# Patient Record
Sex: FEMALE | Race: BLACK OR AFRICAN AMERICAN | NOT HISPANIC OR LATINO | Employment: OTHER | URBAN - NONMETROPOLITAN AREA
[De-identification: names, ages, dates, MRNs, and addresses within clinical notes are randomized per-mention and may not be internally consistent; named-entity substitution may affect disease eponyms.]

---

## 2021-04-21 ENCOUNTER — IMMUNIZATION (OUTPATIENT)
Dept: VACCINE CLINIC | Facility: HOSPITAL | Age: 71
End: 2021-04-21

## 2021-04-21 PROCEDURE — 91300 HC SARSCOV02 VAC 30MCG/0.3ML IM: CPT | Performed by: INTERNAL MEDICINE

## 2021-04-21 PROCEDURE — 0001A: CPT | Performed by: INTERNAL MEDICINE

## 2021-06-17 ENCOUNTER — OFFICE VISIT (OUTPATIENT)
Dept: OBSTETRICS AND GYNECOLOGY | Facility: CLINIC | Age: 71
End: 2021-06-17

## 2021-06-17 VITALS
HEIGHT: 69 IN | SYSTOLIC BLOOD PRESSURE: 130 MMHG | BODY MASS INDEX: 34.72 KG/M2 | DIASTOLIC BLOOD PRESSURE: 80 MMHG | WEIGHT: 234.4 LBS

## 2021-06-17 DIAGNOSIS — R14.0 ABDOMINAL BLOATING: Primary | ICD-10-CM

## 2021-06-17 DIAGNOSIS — R93.89 THICKENED ENDOMETRIUM: ICD-10-CM

## 2021-06-17 DIAGNOSIS — R10.2 PELVIC PAIN: ICD-10-CM

## 2021-06-17 DIAGNOSIS — Z12.31 ENCOUNTER FOR SCREENING MAMMOGRAM FOR MALIGNANT NEOPLASM OF BREAST: ICD-10-CM

## 2021-06-17 DIAGNOSIS — N39.3 STRESS INCONTINENCE: ICD-10-CM

## 2021-06-17 PROCEDURE — 99204 OFFICE O/P NEW MOD 45 MIN: CPT | Performed by: PHYSICIAN ASSISTANT

## 2021-06-17 RX ORDER — LEVOTHYROXINE SODIUM 88 UG/1
88 TABLET ORAL DAILY
COMMUNITY
Start: 2021-03-15 | End: 2022-05-02 | Stop reason: SDUPTHER

## 2021-06-17 RX ORDER — ROSUVASTATIN CALCIUM 20 MG/1
20 TABLET, COATED ORAL DAILY
COMMUNITY
Start: 2021-04-21 | End: 2022-05-02

## 2021-06-17 RX ORDER — LEVETIRACETAM 500 MG/1
500 TABLET ORAL
COMMUNITY
End: 2022-05-02 | Stop reason: SDUPTHER

## 2021-06-17 RX ORDER — HYDROCHLOROTHIAZIDE 12.5 MG/1
TABLET ORAL
COMMUNITY
End: 2022-05-02 | Stop reason: SDUPTHER

## 2021-06-17 NOTE — PROGRESS NOTES
Subjective   Chief Complaint   Patient presents with   • Pelvic Pain     Patient states that she is having severe swelling in uterus x 5 months, nausea and some pain.  History of ovarian cyst.  Patient is concerned that she is pregnant because she is feeling movement in uterus       Nicci Mojica is a 70 y.o. year old  presenting to be seen for complaints of lower abdominal pain, swelling in the uterus area, nausea.  Patient is concerned that she may be pregnant.  She feels that the symptoms she is having is consistent with pregnancy.  Reports her symptoms started about 5 months ago.  She feels some lower pelvic pain and pressure in the left lower quadrant where she thinks her left ovary is.  She had her right ovary removed in 2018 due to an ovarian cyst.   She reports having a colonoscopy that was normal in 2018 at [facility].  She has not had any vaginal bleeding.  Has not had any bowel changes.  She has not had any changes with bladder or difficulty with voiding however she has experienced stress urinary incontinence for a few years.  Her last mammogram was in 2019 and she would like to set up a screening mammogram.  Transvaginal ultrasound is done in office and reviewed with patient.  It notes anteverted normal-appearing uterus, her endometrium is 9.5 mm and heterogeneous with tiny cystic areas.  Her left ovary is not visualized.  There is no free fluid or adnexal masses.      Past Medical History:   Diagnosis Date   • Anemia    • Anxiety    • Asthma    • Bleeding disorder (CMS/HCC)    • Depression    • Disease of thyroid gland    • Fibroid    • Hyperlipidemia    • Migraine    • Multiple gestation    • Osteoarthritis    • Ovarian cyst    • Polycystic ovary syndrome    • Seizures (CMS/HCC)         Current Outpatient Medications:   •  Euthyrox 88 MCG tablet, Take 88 mcg by mouth Daily., Disp: , Rfl:   •  hydroCHLOROthiazide (HYDRODIURIL) 12.5 MG tablet, hydrochlorothiazide 12.5 mg tablet  TAKE 1  TABLET EVERY DAY, Disp: , Rfl:   •  levETIRAcetam (KEPPRA) 500 MG tablet, levetiracetam 500 mg tablet  TAKE 1 TABLET TWICE DAILY, Disp: , Rfl:   •  Prenatal Multivit-Min-Fe-FA (PRENATAL 1 + IRON PO), Prenatal, Disp: , Rfl:   •  rosuvastatin (CRESTOR) 20 MG tablet, Take 20 mg by mouth Daily., Disp: , Rfl:    Allergies   Allergen Reactions   • Sulfa Antibiotics Hives and Swelling   • Contrast Dye Hives   • Penicillins Hives and Rash      Past Surgical History:   Procedure Laterality Date   •  SECTION     • KNEE SURGERY     • OOPHORECTOMY Right       Social History     Socioeconomic History   • Marital status:      Spouse name: Not on file   • Number of children: Not on file   • Years of education: Not on file   • Highest education level: Not on file   Tobacco Use   • Smoking status: Never Smoker   • Smokeless tobacco: Never Used   Vaping Use   • Vaping Use: Never used   Substance and Sexual Activity   • Alcohol use: Defer   • Drug use: Never   • Sexual activity: Yes     Partners: Male     Birth control/protection: Post-menopausal      Family History   Problem Relation Age of Onset   • Colon cancer Father    • Cervical cancer Mother    • Ovarian cancer Mother    • Osteoporosis Mother    • Stroke Mother    • Diabetes Paternal Grandfather    • Coronary artery disease Paternal Grandfather    • Hyperlipidemia Paternal Grandfather    • Diabetes Paternal Grandmother    • Coronary artery disease Paternal Grandmother    • Hyperlipidemia Paternal Grandmother    • Diabetes Maternal Grandmother    • Coronary artery disease Maternal Grandmother    • Hyperlipidemia Maternal Grandmother    • Pulmonary embolism Maternal Grandmother    • Diabetes Maternal Grandfather    • Coronary artery disease Maternal Grandfather    • Hyperlipidemia Maternal Grandfather        Review of Systems   Constitutional: Negative for chills, diaphoresis and fever.   Gastrointestinal: Positive for nausea.   Endocrine: Positive for polydipsia.  "  Genitourinary: Positive for enuresis, pelvic pain and vaginal discharge. Negative for difficulty urinating, dysuria, menstrual problem and vaginal bleeding.   Musculoskeletal: Positive for arthralgias.   Psychiatric/Behavioral: Positive for sleep disturbance.   All other systems reviewed and are negative.          Objective   /80   Ht 175.3 cm (69\")   Wt 106 kg (234 lb 6.4 oz)   Breastfeeding No   BMI 34.61 kg/m²     Physical Exam  Constitutional:       Appearance: Normal appearance. She is well-developed and well-groomed. She is obese.   Eyes:      General: Lids are normal.      Extraocular Movements: Extraocular movements intact.      Conjunctiva/sclera: Conjunctivae normal.   Abdominal:      General: Abdomen is protuberant.      Palpations: Abdomen is soft.      Tenderness: There is no abdominal tenderness.   Genitourinary:     Labia:         Right: No rash, tenderness or lesion.         Left: No rash, tenderness or lesion.       Urethra: No prolapse, urethral pain, urethral swelling or urethral lesion.      Vagina: No vaginal discharge, tenderness or lesions.      Cervix: No cervical motion tenderness, discharge, friability or lesion.      Uterus: Not enlarged and not tender.       Adnexa:         Right: No mass or tenderness.          Left: No mass or tenderness.     Skin:     General: Skin is warm and dry.      Findings: No bruising or lesion.   Neurological:      Mental Status: She is alert and oriented to person, place, and time.   Psychiatric:         Attention and Perception: Attention normal.         Mood and Affect: Mood normal.         Speech: Speech normal.         Behavior: Behavior is cooperative.            Result Review :   The following data was reviewed by: Chantale Sumner PA-C on 06/17/2021:    Data reviewed: ultrasound images            Assessment and Plan  Diagnoses and all orders for this visit:    1. Abdominal bloating (Primary)  -     Cancel: US Non-ob Transvaginal    2. " Pelvic pain    3. Thickened endometrium    4. Encounter for screening mammogram for malignant neoplasm of breast  -     Mammo Screening Digital Tomosynthesis Bilateral With CAD; Future    5. Stress incontinence      Patient Instructions   Patient is reassured that she is not pregnant and she should have no concern regarding pregnancy given her age.  She is advised that the left ovary is not visible on ultrasound however there are no pelvic masses or free fluid noted on ultrasound.  Her uterus is a normal size and does not show any fibroids or abnormalities.  Her endometrium however is thickened for her age and heterogeneous.  I have recommended endometrial sampling with endometrial biopsy in office today, however the patient's  has a medical appointment and she cannot stay at the office any longer today.  She will follow up in 2 weeks for endometrial biopsy.             This note was electronically signed.    Chatnale Sumner PA-C   June 17, 2021

## 2021-06-17 NOTE — PATIENT INSTRUCTIONS
Patient is reassured that she is not pregnant and she should have no concern regarding pregnancy given her age.  She is advised that the left ovary is not visible on ultrasound however there are no pelvic masses or free fluid noted on ultrasound.  Her uterus is a normal size and does not show any fibroids or abnormalities.  Her endometrium however is thickened for her age and heterogeneous.  I have recommended endometrial sampling with endometrial biopsy in office today, however the patient's  has a medical appointment and she cannot stay at the office any longer today.  She will follow up in 2 weeks for endometrial biopsy.

## 2021-07-02 ENCOUNTER — OFFICE VISIT (OUTPATIENT)
Dept: OBSTETRICS AND GYNECOLOGY | Facility: CLINIC | Age: 71
End: 2021-07-02

## 2021-07-02 VITALS
SYSTOLIC BLOOD PRESSURE: 122 MMHG | BODY MASS INDEX: 34.3 KG/M2 | DIASTOLIC BLOOD PRESSURE: 74 MMHG | WEIGHT: 231.6 LBS | HEIGHT: 69 IN

## 2021-07-02 DIAGNOSIS — K43.9 VENTRAL HERNIA WITHOUT OBSTRUCTION OR GANGRENE: ICD-10-CM

## 2021-07-02 DIAGNOSIS — R19.00 ABDOMINAL WALL BULGE: ICD-10-CM

## 2021-07-02 DIAGNOSIS — R93.89 THICKENED ENDOMETRIUM: Primary | ICD-10-CM

## 2021-07-02 PROCEDURE — 99212 OFFICE O/P EST SF 10 MIN: CPT | Performed by: PHYSICIAN ASSISTANT

## 2021-07-02 PROCEDURE — 58100 BIOPSY OF UTERUS LINING: CPT | Performed by: PHYSICIAN ASSISTANT

## 2021-07-02 NOTE — PROGRESS NOTES
Subjective   Chief Complaint   Patient presents with   • Follow-up     Endometrial Biopsy       Nicci Mojica is a 70 y.o. year old  presenting to be seen for endometrial biopsy. She was seen recently for abdominal bloating, pelvic pain, and thickened endometrium noted on ultrasound. Patient could not stay for endometrial biopsy at last appointment due to time constraint.    She had noted abdominal swelling for 5 months and nausea. Transvaginal ultrasound last visit did not note pelvic pathology other than slightly thickened endometrium.         Past Medical History:   Diagnosis Date   • Anemia    • Anxiety    • Asthma    • Bleeding disorder (CMS/HCC)    • Depression    • Disease of thyroid gland    • Fibroid    • Hyperlipidemia    • Migraine    • Multiple gestation    • Osteoarthritis    • Ovarian cyst    • Polycystic ovary syndrome    • Seizures (CMS/HCC)         Current Outpatient Medications:   •  Euthyrox 88 MCG tablet, Take 88 mcg by mouth Daily., Disp: , Rfl:   •  hydroCHLOROthiazide (HYDRODIURIL) 12.5 MG tablet, hydrochlorothiazide 12.5 mg tablet  TAKE 1 TABLET EVERY DAY, Disp: , Rfl:   •  levETIRAcetam (KEPPRA) 500 MG tablet, levetiracetam 500 mg tablet  TAKE 1 TABLET TWICE DAILY, Disp: , Rfl:   •  Prenatal Multivit-Min-Fe-FA (PRENATAL 1 + IRON PO), Prenatal, Disp: , Rfl:   •  rosuvastatin (CRESTOR) 20 MG tablet, Take 20 mg by mouth Daily., Disp: , Rfl:    Allergies   Allergen Reactions   • Sulfa Antibiotics Hives and Swelling   • Contrast Dye Hives   • Penicillins Hives and Rash      Past Surgical History:   Procedure Laterality Date   •  SECTION     • KNEE SURGERY     • OOPHORECTOMY Right       Social History     Socioeconomic History   • Marital status:      Spouse name: Not on file   • Number of children: Not on file   • Years of education: Not on file   • Highest education level: Not on file   Tobacco Use   • Smoking status: Never Smoker   • Smokeless tobacco: Never Used   Vaping  "Use   • Vaping Use: Never used   Substance and Sexual Activity   • Alcohol use: Defer   • Drug use: Never   • Sexual activity: Yes     Partners: Male     Birth control/protection: Post-menopausal      Family History   Problem Relation Age of Onset   • Colon cancer Father    • Cervical cancer Mother    • Ovarian cancer Mother    • Osteoporosis Mother    • Stroke Mother    • Diabetes Paternal Grandfather    • Coronary artery disease Paternal Grandfather    • Hyperlipidemia Paternal Grandfather    • Diabetes Paternal Grandmother    • Coronary artery disease Paternal Grandmother    • Hyperlipidemia Paternal Grandmother    • Diabetes Maternal Grandmother    • Coronary artery disease Maternal Grandmother    • Hyperlipidemia Maternal Grandmother    • Pulmonary embolism Maternal Grandmother    • Diabetes Maternal Grandfather    • Coronary artery disease Maternal Grandfather    • Hyperlipidemia Maternal Grandfather        Review of Systems   Constitutional: Negative for chills, diaphoresis and fever.   Gastrointestinal: Positive for abdominal distention, abdominal pain and nausea.   Genitourinary: Negative for difficulty urinating, dysuria, vaginal bleeding and vaginal discharge.           Objective   /74   Ht 175.3 cm (69\")   Wt 105 kg (231 lb 9.6 oz)   Breastfeeding No   BMI 34.20 kg/m²     Physical Exam  Constitutional:       Appearance: Normal appearance. She is well-developed and well-groomed. She is obese.   Eyes:      General: Lids are normal.      Extraocular Movements: Extraocular movements intact.      Conjunctiva/sclera: Conjunctivae normal.   Abdominal:          Comments: Large abdominal wall buldge from epigastrum to umbilucus approximately 10 cm wide noted when patient was getting up from supine position.   Skin:     General: Skin is warm and dry.      Findings: No bruising or lesion.   Neurological:      Mental Status: She is alert.   Psychiatric:         Attention and Perception: Attention normal.   "       Mood and Affect: Mood normal.         Speech: Speech normal.         Behavior: Behavior is cooperative.            Result Review :                   Assessment and Plan  Diagnoses and all orders for this visit:    1. Thickened endometrium (Primary)    2. Abdominal wall bulge  -     Ambulatory Referral to General Surgery    3. Ventral hernia without obstruction or gangrene  -     Ambulatory Referral to General Surgery      Patient Instructions   With large abdominal bulge/ventral hernia noted on exam today this is most likely where patient symptoms of perceived swelling and bloating are coming from.  Recommend referral to general surgery for further evaluation and management.  Patient be contacted with results of endometrial biopsy when available.             This note was electronically signed.    Chantale Sumner PA-C   July 6, 2021

## 2021-07-02 NOTE — PROGRESS NOTES
Endometrial Biopsy    Date of procedure:  7/2/2021    Indication:                                    Thickened endometrium    Procedure documentation:    After informed consent obtained and all questions answered, the patient was placed in lithotomy position.  The cervix was grasped anterior at the 12 o'clock position.  The cavity sounded to 7 centimeters.  An endometrial biopsy specimen was obtained with multiple passes. Scant tissue obtained.  The tissue was sent for permanent histopathologic evaluation.  Tenaculum was removed from the cervix with scant bleeding. Patient tolerated the procedure well. EBL minimal.    Post procedure instructions: She was instructed to call us in 1 week's time if she has not heard from us otherwise.  If there is any significant pelvic pain,  fever,  or excessive bleeding she is to call immediately.    Follow up  prn    This note was electronically signed.    Chantale Sumner PA-C  July 2, 2021

## 2021-07-06 NOTE — PATIENT INSTRUCTIONS
With large abdominal bulge/ventral hernia noted on exam today this is most likely where patient symptoms of perceived swelling and bloating are coming from.  Recommend referral to general surgery for further evaluation and management.  Patient be contacted with results of endometrial biopsy when available.

## 2021-07-08 DIAGNOSIS — R93.89 THICKENED ENDOMETRIUM: ICD-10-CM

## 2021-07-09 NOTE — PROGRESS NOTES
Patient: Nicci Mojica    YOB: 1950    Date: 07/13/2021    Primary Care Provider: Chantale Sumner PA-C    Chief Complaint   Patient presents with   • Mass     abdominal wall       SUBJECTIVE:    History of present illness: Patient with a 5-month history of worsening epigastric/upper abdominal sharp pain.  She states that it is difficult for her to sleep.  She has also noticed a bulge in that area and suspects a hernia.  She has no significant nausea or vomiting.  Some chronic constipation.    The following portions of the patient's history were reviewed and updated as appropriate: allergies, current medications, past family history, past medical history, past social history, past surgical history and problem list.    Review of Systems   Constitutional: Negative for chills, fever and unexpected weight change.   HENT: Negative for hearing loss, trouble swallowing and voice change.    Eyes: Negative for visual disturbance.   Respiratory: Negative for apnea, cough, chest tightness, shortness of breath and wheezing.    Cardiovascular: Negative for chest pain, palpitations and leg swelling.   Gastrointestinal: Positive for abdominal pain and constipation. Negative for abdominal distention, anal bleeding, blood in stool, diarrhea, nausea, rectal pain and vomiting.   Endocrine: Negative for cold intolerance and heat intolerance.   Genitourinary: Negative for difficulty urinating, dysuria and flank pain.   Musculoskeletal: Negative for back pain and gait problem.   Skin: Negative for color change, rash and wound.   Neurological: Negative for dizziness, syncope, speech difficulty, weakness, light-headedness, numbness and headaches.   Hematological: Negative for adenopathy. Does not bruise/bleed easily.   Psychiatric/Behavioral: Negative for confusion. The patient is not nervous/anxious.        History:  Past Medical History:   Diagnosis Date   • Anemia    • Anxiety    • Asthma    • Bleeding disorder  (CMS/HCC)    • Depression    • Disease of thyroid gland    • Fibroid    • Hyperlipidemia    • Migraine    • Multiple gestation    • Osteoarthritis    • Ovarian cyst    • Polycystic ovary syndrome    • Seizures (CMS/HCC)        Past Surgical History:   Procedure Laterality Date   •  SECTION     • KNEE SURGERY     • OOPHORECTOMY Right        Family History   Problem Relation Age of Onset   • Colon cancer Father    • Cervical cancer Mother    • Ovarian cancer Mother    • Osteoporosis Mother    • Stroke Mother    • Diabetes Paternal Grandfather    • Coronary artery disease Paternal Grandfather    • Hyperlipidemia Paternal Grandfather    • Diabetes Paternal Grandmother    • Coronary artery disease Paternal Grandmother    • Hyperlipidemia Paternal Grandmother    • Diabetes Maternal Grandmother    • Coronary artery disease Maternal Grandmother    • Hyperlipidemia Maternal Grandmother    • Pulmonary embolism Maternal Grandmother    • Diabetes Maternal Grandfather    • Coronary artery disease Maternal Grandfather    • Hyperlipidemia Maternal Grandfather        Social History     Tobacco Use   • Smoking status: Never Smoker   • Smokeless tobacco: Never Used   Vaping Use   • Vaping Use: Never used   Substance Use Topics   • Alcohol use: Defer   • Drug use: Never       Allergies:  Allergies   Allergen Reactions   • Sulfa Antibiotics Hives and Swelling   • Contrast Dye Hives   • Penicillins Hives and Rash       Medications:    Current Outpatient Medications:   •  Euthyrox 88 MCG tablet, Take 88 mcg by mouth Daily., Disp: , Rfl:   •  hydroCHLOROthiazide (HYDRODIURIL) 12.5 MG tablet, hydrochlorothiazide 12.5 mg tablet  TAKE 1 TABLET EVERY DAY, Disp: , Rfl:   •  levETIRAcetam (KEPPRA) 500 MG tablet, levetiracetam 500 mg tablet  TAKE 1 TABLET TWICE DAILY, Disp: , Rfl:   •  Prenatal Multivit-Min-Fe-FA (PRENATAL 1 + IRON PO), Prenatal, Disp: , Rfl:   •  rosuvastatin (CRESTOR) 20 MG tablet, Take 20 mg by mouth Daily., Disp: ,  "Rfl:     OBJECTIVE:    Vital Signs:   Vitals:    07/13/21 0821   BP: 128/80   Pulse: 81   Temp: 97.1 °F (36.2 °C)   SpO2: 96%   Weight: 105 kg (231 lb)   Height: 175.3 cm (69\")       Physical Exam:   General Appearance:    Alert, cooperative, in no acute distress   Head:    Normocephalic, without obvious abnormality, atraumatic   Eyes:            Normal.  No scleral icterus.  PERRLA    Lungs:     Clear to auscultation,respirations regular, even and                  unlabored    Heart:    Regular rhythm and normal rate, normal S1 and S2, no            murmur   Abdomen:     Normal bowel sounds, no masses, no organomegaly, soft        non-tender, non-distended, no guarding, epigastric likely diastases   Extremities:   Moves all extremities well, no edema, no cyanosis, no             redness   Skin:   No bleeding, bruising or rash   Neurologic:   Normal without gross deficits.   Psychiatric: No evidence of depression or anxiety          Results Review:   None    Review of Systems was reviewed and confirmed as accurate as documented by the MA.    ASSESSMENT/PLAN:    1. Epigastric pain    2. Abdominal wall bulge      I suspect the abdominal bulge in the epigastrium is a diastases rather than a hernia however I have ordered a CT scan to further evaluate especially since she is having significant epigastric pain.  Patient is agreeable with this and will follow up with me after her CT.    Electronically signed by Pato Ramirez MD  07/13/21            "

## 2021-07-13 ENCOUNTER — OFFICE VISIT (OUTPATIENT)
Dept: SURGERY | Facility: CLINIC | Age: 71
End: 2021-07-13

## 2021-07-13 VITALS
SYSTOLIC BLOOD PRESSURE: 128 MMHG | OXYGEN SATURATION: 96 % | BODY MASS INDEX: 34.21 KG/M2 | TEMPERATURE: 97.1 F | DIASTOLIC BLOOD PRESSURE: 80 MMHG | WEIGHT: 231 LBS | HEART RATE: 81 BPM | HEIGHT: 69 IN

## 2021-07-13 DIAGNOSIS — R10.13 EPIGASTRIC PAIN: Primary | ICD-10-CM

## 2021-07-13 DIAGNOSIS — R19.00 ABDOMINAL WALL BULGE: ICD-10-CM

## 2021-07-13 PROCEDURE — 99203 OFFICE O/P NEW LOW 30 MIN: CPT | Performed by: SURGERY

## 2021-07-23 ENCOUNTER — HOSPITAL ENCOUNTER (OUTPATIENT)
Dept: CT IMAGING | Facility: HOSPITAL | Age: 71
Discharge: HOME | End: 2021-07-23
Admitting: SURGERY

## 2021-07-23 DIAGNOSIS — R10.13 EPIGASTRIC PAIN: ICD-10-CM

## 2021-07-23 DIAGNOSIS — R19.00 ABDOMINAL WALL BULGE: ICD-10-CM

## 2021-07-23 PROCEDURE — 0 DIATRIZOATE MEGLUMINE & SODIUM PER 1 ML: Performed by: SURGERY

## 2021-07-23 PROCEDURE — 74176 CT ABD & PELVIS W/O CONTRAST: CPT

## 2021-07-23 RX ADMIN — DIATRIZOATE MEGLUMINE AND DIATRIZOATE SODIUM 30 ML: 660; 100 LIQUID ORAL; RECTAL at 11:24

## 2021-08-04 ENCOUNTER — HOSPITAL ENCOUNTER (OUTPATIENT)
Dept: MAMMOGRAPHY | Facility: HOSPITAL | Age: 71
Discharge: HOME | End: 2021-08-04
Admitting: PHYSICIAN ASSISTANT

## 2021-08-04 DIAGNOSIS — Z12.31 ENCOUNTER FOR SCREENING MAMMOGRAM FOR MALIGNANT NEOPLASM OF BREAST: ICD-10-CM

## 2021-08-04 PROCEDURE — 77063 BREAST TOMOSYNTHESIS BI: CPT

## 2021-08-04 PROCEDURE — 77067 SCR MAMMO BI INCL CAD: CPT

## 2021-08-05 ENCOUNTER — OFFICE VISIT (OUTPATIENT)
Dept: SURGERY | Facility: CLINIC | Age: 71
End: 2021-08-05

## 2021-08-05 VITALS
HEIGHT: 69 IN | WEIGHT: 230 LBS | DIASTOLIC BLOOD PRESSURE: 82 MMHG | SYSTOLIC BLOOD PRESSURE: 140 MMHG | TEMPERATURE: 97.3 F | HEART RATE: 80 BPM | OXYGEN SATURATION: 98 % | BODY MASS INDEX: 34.07 KG/M2

## 2021-08-05 DIAGNOSIS — R10.13 EPIGASTRIC PAIN: Primary | ICD-10-CM

## 2021-08-05 DIAGNOSIS — M62.08 DIASTASIS OF RECTUS ABDOMINIS: ICD-10-CM

## 2021-08-05 PROCEDURE — 99213 OFFICE O/P EST LOW 20 MIN: CPT | Performed by: SURGERY

## 2022-01-19 PROCEDURE — U0004 COV-19 TEST NON-CDC HGH THRU: HCPCS | Performed by: FAMILY MEDICINE

## 2022-05-02 ENCOUNTER — OFFICE VISIT (OUTPATIENT)
Dept: INTERNAL MEDICINE | Facility: CLINIC | Age: 72
End: 2022-05-02

## 2022-05-02 VITALS
TEMPERATURE: 97.1 F | HEART RATE: 75 BPM | WEIGHT: 220 LBS | RESPIRATION RATE: 12 BRPM | BODY MASS INDEX: 34.53 KG/M2 | SYSTOLIC BLOOD PRESSURE: 146 MMHG | HEIGHT: 67 IN | DIASTOLIC BLOOD PRESSURE: 84 MMHG | OXYGEN SATURATION: 99 %

## 2022-05-02 DIAGNOSIS — Z23 NEED FOR VACCINATION: ICD-10-CM

## 2022-05-02 DIAGNOSIS — Z87.39: ICD-10-CM

## 2022-05-02 DIAGNOSIS — Z23 NEED FOR COVID-19 VACCINE: ICD-10-CM

## 2022-05-02 DIAGNOSIS — Z13.29 SCREENING FOR ENDOCRINE, METABOLIC AND IMMUNITY DISORDER: ICD-10-CM

## 2022-05-02 DIAGNOSIS — M81.8 OTHER OSTEOPOROSIS WITHOUT CURRENT PATHOLOGICAL FRACTURE: ICD-10-CM

## 2022-05-02 DIAGNOSIS — Z76.89 ENCOUNTER TO ESTABLISH CARE: ICD-10-CM

## 2022-05-02 DIAGNOSIS — R56.9 SEIZURES: ICD-10-CM

## 2022-05-02 DIAGNOSIS — Z13.0 SCREENING FOR ENDOCRINE, METABOLIC AND IMMUNITY DISORDER: ICD-10-CM

## 2022-05-02 DIAGNOSIS — I10 ESSENTIAL HYPERTENSION: Primary | ICD-10-CM

## 2022-05-02 DIAGNOSIS — Z13.228 SCREENING FOR ENDOCRINE, METABOLIC AND IMMUNITY DISORDER: ICD-10-CM

## 2022-05-02 DIAGNOSIS — E03.9 HYPOTHYROIDISM, UNSPECIFIED TYPE: ICD-10-CM

## 2022-05-02 PROCEDURE — 91305 COVID-19 (PFIZER) 12+ YRS: CPT | Performed by: NURSE PRACTITIONER

## 2022-05-02 PROCEDURE — 0053A COVID-19 (PFIZER) 12+ YRS: CPT | Performed by: NURSE PRACTITIONER

## 2022-05-02 PROCEDURE — 99214 OFFICE O/P EST MOD 30 MIN: CPT | Performed by: NURSE PRACTITIONER

## 2022-05-02 RX ORDER — HYDROCHLOROTHIAZIDE 12.5 MG/1
12.5 TABLET ORAL DAILY
Qty: 90 TABLET | Refills: 0 | Status: SHIPPED | OUTPATIENT
Start: 2022-05-02 | End: 2022-08-01 | Stop reason: SDUPTHER

## 2022-05-02 RX ORDER — LEVETIRACETAM 500 MG/1
500 TABLET ORAL
Status: CANCELLED | OUTPATIENT
Start: 2022-05-02

## 2022-05-02 RX ORDER — LEVOTHYROXINE SODIUM 88 UG/1
88 TABLET ORAL DAILY
Qty: 90 TABLET | Refills: 1 | Status: SHIPPED | OUTPATIENT
Start: 2022-05-02 | End: 2022-05-20 | Stop reason: SDUPTHER

## 2022-05-02 RX ORDER — MULTIPLE VITAMINS W/ MINERALS TAB 9MG-400MCG
1 TAB ORAL DAILY
COMMUNITY
End: 2022-07-08

## 2022-05-02 RX ORDER — LEVETIRACETAM 100 MG/ML
SOLUTION ORAL
Qty: 300 ML | Refills: 5 | Status: SHIPPED | OUTPATIENT
Start: 2022-05-02 | End: 2022-07-08 | Stop reason: SDUPTHER

## 2022-05-02 NOTE — PROGRESS NOTES
"  [facility]  Adult Nutrition  Consult Note    SUMMARY     Recommendations    1. Encourage PO intake of meals.   2. If PO intake <50% of meals recommend Boost Plus BID.   3. Weekly weights.     Goals: 1.>75% of EEN, EPN by RD follow up.   Nutrition Goal Status: new  Communication of RD Recs: other (comment)(POC)    Reason for Assessment    Reason For Assessment: consult  Diagnosis: (Paresthesia of left upper and lower extremity)  Interdisciplinary Rounds: did not attend  General Information Comments:   6.11.10- Pt is a 35 year old female with Paresthesia. Pt is off floor for MRI. Pt diet advanced to regular.  lbs, no signifigant change in weight. RD to complete NFPE on follow up.  Nutrition Discharge Planning: Adequate nutrition to meet needs via regular diet.     Nutrition Risk Screen    Nutrition Risk Screen: no indicators present    Nutrition/Diet History    Spiritual, Cultural Beliefs, Latter-day Practices, Values that Affect Care: no(None stated)  Food Allergies: (Gluten protein, soy )    Anthropometrics    Temp: 97.9 °F (36.6 °C)  Height Method: Stated  Height: 5' 4" (162.6 cm)  Height (inches): 64 in  Weight Method: Bed Scale  Weight: 86.5 kg (190 lb 11.2 oz)  Weight (lb): 190.7 lb  Ideal Body Weight (IBW), Female: 120 lb  % Ideal Body Weight, Female (lb): 158.92 %  BMI (Calculated): 32.7  BMI Grade: 30 - 34.9- obesity - grade I  Usual Body Weight (UBW), k.6 kg  % Usual Body Weight: 97.83  % Weight Change From Usual Weight: -2.37 %     Lab/Procedures/Meds    Pertinent Labs Reviewed: reviewed  Pertinent Labs Comments: WDL  Pertinent Medications Reviewed: reviewed    Estimated/Assessed Needs    Weight Used For Calorie Calculations: 86.5 kg (190 lb 11.2 oz)  Energy Calorie Requirements (kcal): 1937 kcals/day(x 1.25)  Energy Need Method: [place]  Protein Requirements: 69.34-86.68 g/day(0.8-1.0 g/kg)  Weight Used For Protein Calculations: 86.5 kg (190 lb 11.2 oz)  Fluid " "  Office Visit      Patient Name: Nicci Mojica  : 1950   MRN: [id number]   Care Team: Patient Care Team:  Magdalene Strauss APRN as PCP - General (Family Medicine)  Pato Ramirez MD as Consulting Physician (General Surgery)    Chief Complaint  Establish Care (Discuss med refills, needs HCTZ, Keppra, discuss thyroid, )    Subjective     Subjective      Nicci Mojica presents to [facility] to establish care and med refills.     HTN; Taking HCTZ 12.5 mg daily. Checks blood pressure daily, brought readings in with her to visit with average <120s/80s. Denies headache, chest pain/pressure, palpitations, edema, weakness. Patient does report \"seeing spots\" sometimes, had eye exam last year and diagnosed with cataracts bilaterally but has not been back or scheduled to have them removed.    Seizure disorder; follows neurology Dr. Otoniel Flowers with [facility]. Last seizure activity . Takes Keppra 500 mg BID, liquid form. Tolerated without side effects. Reports that the pills were too large to swallow. Denies trouble swallowing food/drink day to day. Out of the medication for 2 months. Has appointment  with Dr. Flowers.    Hypothyroidism; reports family history of thyroid disorder (mother; ), denies heat/cold intolerance, trouble swallowing, swelling in neck. Tolerates medication without side effects.     Last Colonoscopy 2019 through [facility]; normal per patient  Mammogram last year; normal, report reviewed  Due COVID-19 booster, Tdap, Shingles vaccines     Review of Systems   Constitutional: Negative for fatigue, unexpected weight gain and unexpected weight loss.   HENT: Negative for sore throat and swollen glands.    Eyes: Positive for visual disturbance.   Respiratory: Negative for cough, chest tightness, shortness of breath and wheezing.    Cardiovascular: Negative for chest pain, palpitations and leg swelling.   Gastrointestinal: Negative for " "abdominal pain, diarrhea, nausea and vomiting.   All other systems reviewed and are negative.      Health Maintenance   Topic Date Due   • DXA SCAN  Never done   • TDAP/TD VACCINES (1 - Tdap) Never done   • ZOSTER VACCINE (1 of 2) Never done   • LIPID PANEL  Never done   • MAMMOGRAM  08/04/2023       Objective     Objective   Vital Signs:   /84   Pulse 75   Temp 97.1 °F (36.2 °C) (Temporal)   Resp 12   Ht 170.2 cm (67\")   Wt 99.8 kg (220 lb)   SpO2 99%   BMI 34.46 kg/m²     Physical Exam  Constitutional:       Appearance: Normal appearance. She is obese.   HENT:      Head: Normocephalic and atraumatic.   Eyes:      Extraocular Movements: Extraocular movements intact.      Conjunctiva/sclera: Conjunctivae normal.      Pupils: Pupils are equal, round, and reactive to light.   Neck:      Thyroid: No thyroid mass, thyromegaly or thyroid tenderness.   Cardiovascular:      Rate and Rhythm: Normal rate and regular rhythm.   Pulmonary:      Breath sounds: Normal breath sounds.   Abdominal:      General: Abdomen is flat. Bowel sounds are normal.      Palpations: Abdomen is soft.   Musculoskeletal:         General: Normal range of motion.      Cervical back: Normal range of motion.   Lymphadenopathy:      Cervical: No cervical adenopathy.   Skin:     General: Skin is warm and dry.   Neurological:      Mental Status: She is alert and oriented to person, place, and time.          Assessment / Plan      Assessment/Plan   Problem List Items Addressed This Visit    None     Visit Diagnoses     Essential hypertension    -  Primary    Relevant Medications    Refilled hydroCHLOROthiazide (HYDRODIURIL) 12.5 MG tablet    Other Relevant Orders    CBC w AUTO Differential    Comprehensive Metabolic Panel    Lipid Panel    TSH    T4, free  Home BP readings stable. Labs ordered. Continue current medications as prescribed. Recommend DASH diet, moderate-intensity exercises 4-5 times/week, medication compliance, and home blood " Requirements (mL): 1mL/kcal or per MD  Estimated Fluid Requirement Method: RDA Method  RDA Method (mL): 1937     Nutrition Prescription Ordered    Current Diet Order: Regular    Evaluation of Received Nutrient/Fluid Intake    Energy Calories Required: not meeting needs  Protein Required: not meeting needs  Fluid Required: meeting needs  Comments: LBM 6.9.20  % Intake of Estimated Energy Needs: 25 - 50 %  % Meal Intake: 25 - 50 %    Nutrition Risk    Level of Risk/Frequency of Follow-up: low     Assessment and Plan    * Paresthesia of left upper and lower extremity  Contributing Nutrition Diagnosis  Inadequate energy intake    Related to (etiology):   Decreased appetite    Signs and Symptoms (as evidenced by):   PO intake <75% of meals    Interventions (treatment strategy):  Collaboration with other providers    Nutrition Diagnosis Status:   New    Monitor and Evaluation    Food and Nutrient Intake: food and beverage intake  Food and Nutrient Adminstration: diet order  Knowledge/Beliefs/Attitudes: food and nutrition knowledge/skill  Physical Activity and Function: nutrition-related ADLs and IADLs  Anthropometric Measurements: weight  Nutrition-Focused Physical Findings: overall appearance     Malnutrition Assessment     Dale Score: 22  RD to complete NFPE on follow up    Nutrition Follow-Up    RD Follow-up?: Yes     pressure monitoring.     Seizures (HCC)        Relevant Medications    Refilled levETIRAcetam (Keppra) 100 MG/ML solution  F/u with Dr. Flowers [facility] for continuing management     Hypothyroidism, unspecified type        Relevant Medications    Refilled Euthyrox 88 MCG tablet  Labs ordered    Other Relevant Orders    TSH    T4, free    Encounter to establish care        Relevant Orders    CBC w AUTO Differential    Comprehensive Metabolic Panel    Lipid Panel    TSH    T4, free    Need for vaccination        Relevant Orders    COVID-19 Vaccine (Pfizer) Gray Cap    Need for COVID-19 vaccine        Relevant Orders    COVID-19 Vaccine (Pfizer) Gray Cap    Screening for endocrine, metabolic and immunity disorder        Relevant Orders    Hemoglobin A1c    Personal history of osteoarthritis        Relevant Orders    DEXA Bone Density Axial    Other osteoporosis without current pathological fracture         Relevant Orders    DEXA Bone Density Axial                 Follow Up   Return in about 3 months (around 8/2/2022) for Medicare Wellness.  Patient was given instructions and counseling regarding her condition or for health maintenance advice. Please see specific information pulled into the AVS if appropriate.     HANNY Villatoro  [facility] [place]

## 2022-05-05 ENCOUNTER — TELEPHONE (OUTPATIENT)
Dept: INTERNAL MEDICINE | Facility: CLINIC | Age: 72
End: 2022-05-05

## 2022-05-05 NOTE — TELEPHONE ENCOUNTER
Fax received from [facility] requesting refill of Hydrochlorothiazide.  Called patient to confirm pharmacy information.  Medication refill had been sent to [facility] on 5/2.  Patient verified that [facility] is the preferred pharmacy.

## 2022-05-10 ENCOUNTER — TELEPHONE (OUTPATIENT)
Dept: INTERNAL MEDICINE | Facility: CLINIC | Age: 72
End: 2022-05-10

## 2022-05-10 DIAGNOSIS — I10 ESSENTIAL HYPERTENSION: ICD-10-CM

## 2022-05-19 ENCOUNTER — OFFICE VISIT (OUTPATIENT)
Dept: INTERNAL MEDICINE | Facility: CLINIC | Age: 72
End: 2022-05-19

## 2022-05-19 VITALS
OXYGEN SATURATION: 97 % | SYSTOLIC BLOOD PRESSURE: 140 MMHG | DIASTOLIC BLOOD PRESSURE: 86 MMHG | HEIGHT: 69 IN | BODY MASS INDEX: 33 KG/M2 | WEIGHT: 222.8 LBS | TEMPERATURE: 96.9 F | HEART RATE: 71 BPM

## 2022-05-19 DIAGNOSIS — E03.9 HYPOTHYROIDISM, UNSPECIFIED TYPE: Primary | ICD-10-CM

## 2022-05-19 DIAGNOSIS — I10 ESSENTIAL HYPERTENSION: ICD-10-CM

## 2022-05-19 DIAGNOSIS — L72.0 EPIDERMAL CYST OF NECK: ICD-10-CM

## 2022-05-19 PROCEDURE — 99213 OFFICE O/P EST LOW 20 MIN: CPT | Performed by: NURSE PRACTITIONER

## 2022-05-19 NOTE — PROGRESS NOTES
"  Office Visit      Patient Name: Nicci Mojica  : 1950   MRN: [id number]   Care Team: Patient Care Team:  Magdalene Strauss APRN as PCP - General (Family Medicine)  Pato Ramirez MD as Consulting Physician (General Surgery)    Chief Complaint  Hypothyroidism (Feels like she is having some side effects. Headaches, nausea, and insomnia. )    Subjective     Subjective      Nicci Mojica presents to [facility] to discuss euthyrox. Since starting the medication back, she has had headaches, nausea and issues sleeping. Reports she use to take the \"name brand\" and it did not cause her any issues but the euthrox has before. Reports she has been on the keppra and HCTZ for years without issues, not taking or doing anything different other than added thyroid medication. Her BP is elevated in office, but home readings brought in and within normal range. Average 120s/70s. Denies chest pain, headaches, lower extremity edema.     Cyst; right side of neck, onset \"months\" ago. Feel like it could be getting bigger. Nature: tender. No alleviating/aggravatging factors. No erythema or drainage. No fever, aches, chills.     Review of Systems  See HPI     Health Maintenance   Topic Date Due   • DXA SCAN  Never done   • TDAP/TD VACCINES (1 - Tdap) Never done   • ZOSTER VACCINE (1 of 2) Never done   • LIPID PANEL  Never done   • MAMMOGRAM  2023       Objective     Objective   Vital Signs:   /86   Pulse 71   Temp 96.9 °F (36.1 °C)   Ht 175.3 cm (69\")   Wt 101 kg (222 lb 12.8 oz)   SpO2 97%   BMI 32.90 kg/m²     Physical Exam  Constitutional:       Appearance: Normal appearance. She is obese.   Cardiovascular:      Rate and Rhythm: Normal rate and regular rhythm.   Pulmonary:      Effort: Pulmonary effort is normal.      Breath sounds: Normal breath sounds.   Abdominal:      General: Abdomen is flat. Bowel sounds are normal.      Palpations: Abdomen is soft.   Musculoskeletal:    "      General: Normal range of motion.   Skin:     Coloration: Skin is not jaundiced or pale.      Comments: Epidermal cyst palpated right side of neck, nontender, no erythema, warmth, or drainage   Neurological:      Mental Status: She is alert and oriented to person, place, and time.   Psychiatric:         Mood and Affect: Mood normal.          Assessment / Plan      Assessment & Plan   Problem List Items Addressed This Visit    None     Visit Diagnoses     Hypothyroidism, unspecified type    -  Primary    Essential hypertension        Epidermal cyst of neck        Relevant Orders    Ambulatory Referral to Dermatology           Discussion Summary:  - Hypothyroidism; obtain baseline labs today to check thyroid function. Will send another form of thyroid replacement to see if more tolerable.  - HTN; patient's home readings are normal, but elevated in office. Instructed patient to bring machine in to compare to manual reading to ensure machine at home is accurate. Can do this as a nurse visit anytime.   - Epidermal cyst; referral placed to dermatology. No signs of infection at this time.    Follow Up   Return in about 2 months (around 7/19/2022) for Medicare Wellness.  Patient was given instructions and counseling regarding her condition or for health maintenance advice. Please see specific information pulled into the AVS if appropriate.     HANNY Villatoro  [facility]

## 2022-05-19 NOTE — PROGRESS NOTES
"  Office Visit      Patient Name: Nicci Mojica  : 1950   MRN: [id number]   Care Team: Patient Care Team:  Magdalene Strauss APRN as PCP - General (Family Medicine)  Pato Ramirez MD as Consulting Physician (General Surgery)    Chief Complaint  Hypothyroidism (Feels like she is having some side effects. Headaches, nausea, and insomnia. )    Subjective     Subjective      Nicci Mojica presents to [facility] to discuss euthyrox. Since starting the medication back, she has had headaches, nausea and issues sleeping. Reports she use to take the \"name brand\" and it did not cause her any issues but the euthrox has before. Reports she has been on the keppra and HCTZ for years without issues, not taking or doing anything different other than added thyroid medication. Her BP is elevated in office, but home readings brought in and within normal range. Average 120s/70s. Denies chest pain, headaches, lower extremity edema.     Cyst; right side of neck, onset \"months\" ago. Feel like it could be getting bigger. Nature: tender. No alleviating/aggravatging factors. No erythema or drainage. No fever, aches, chills.     Review of Systems  See HPI     Health Maintenance   Topic Date Due   • DXA SCAN  Never done   • TDAP/TD VACCINES (1 - Tdap) Never done   • ZOSTER VACCINE (1 of 2) Never done   • LIPID PANEL  Never done   • MAMMOGRAM  2023       Objective     Objective   Vital Signs:   /86   Pulse 71   Temp 96.9 °F (36.1 °C)   Ht 175.3 cm (69\")   Wt 101 kg (222 lb 12.8 oz)   SpO2 97%   BMI 32.90 kg/m²     Physical Exam  Constitutional:       Appearance: Normal appearance. She is obese.   Cardiovascular:      Rate and Rhythm: Normal rate and regular rhythm.   Pulmonary:      Effort: Pulmonary effort is normal.      Breath sounds: Normal breath sounds.   Abdominal:      General: Abdomen is flat. Bowel sounds are normal.      Palpations: Abdomen is soft.   Musculoskeletal:    "      General: Normal range of motion.   Skin:     Coloration: Skin is not jaundiced or pale.      Comments: Epidermal cyst palpated right side of neck, nontender, no erythema, warmth, or drainage   Neurological:      Mental Status: She is alert and oriented to person, place, and time.   Psychiatric:         Mood and Affect: Mood normal.          Assessment / Plan      Assessment & Plan   Problem List Items Addressed This Visit    None     Visit Diagnoses     Hypothyroidism, unspecified type    -  Primary    Essential hypertension        Epidermal cyst of neck        Relevant Orders    Ambulatory Referral to Dermatology           Discussion Summary:  - Hypothyroidism; obtain baseline labs today to check thyroid function. Will send another form of thyroid replacement to see if more tolerable.  - HTN; patient's home readings are normal, but elevated in office. Instructed patient to bring machine in to compare to manual reading to ensure machine at home is accurate. Can do this as a nurse visit anytime.   - Epidermal cyst; referral placed to dermatology. No signs of infection at this time.    Follow Up   Return in about 2 months (around 7/19/2022) for Medicare Wellness.  Patient was given instructions and counseling regarding her condition or for health maintenance advice. Please see specific information pulled into the AVS if appropriate.     HANNY Villatoro  [facility]

## 2022-05-20 DIAGNOSIS — E03.9 HYPOTHYROIDISM, UNSPECIFIED TYPE: Primary | ICD-10-CM

## 2022-05-20 DIAGNOSIS — E03.9 HYPOTHYROIDISM, UNSPECIFIED TYPE: ICD-10-CM

## 2022-05-20 DIAGNOSIS — E78.2 MIXED HYPERLIPIDEMIA: ICD-10-CM

## 2022-05-20 LAB
ALBUMIN SERPL-MCNC: 4.1 G/DL (ref 3.5–5.2)
ALBUMIN/GLOB SERPL: 1 G/DL
ALP SERPL-CCNC: 95 U/L (ref 39–117)
ALT SERPL-CCNC: 17 U/L (ref 1–33)
AST SERPL-CCNC: 19 U/L (ref 1–32)
BASOPHILS # BLD AUTO: 0.03 10*3/MM3 (ref 0–0.2)
BASOPHILS NFR BLD AUTO: 0.4 % (ref 0–1.5)
BILIRUB SERPL-MCNC: 0.6 MG/DL (ref 0–1.2)
BUN SERPL-MCNC: 12 MG/DL (ref 8–23)
BUN/CREAT SERPL: 10.6 (ref 7–25)
CALCIUM SERPL-MCNC: 9.8 MG/DL (ref 8.6–10.5)
CHLORIDE SERPL-SCNC: 99 MMOL/L (ref 98–107)
CHOLEST SERPL-MCNC: 289 MG/DL (ref 0–200)
CO2 SERPL-SCNC: 28.2 MMOL/L (ref 22–29)
CREAT SERPL-MCNC: 1.13 MG/DL (ref 0.57–1)
EGFRCR SERPLBLD CKD-EPI 2021: 52.4 ML/MIN/1.73
EOSINOPHIL # BLD AUTO: 0.22 10*3/MM3 (ref 0–0.4)
EOSINOPHIL NFR BLD AUTO: 3.3 % (ref 0.3–6.2)
ERYTHROCYTE [DISTWIDTH] IN BLOOD BY AUTOMATED COUNT: 13.8 % (ref 12.3–15.4)
GLOBULIN SER CALC-MCNC: 4.3 GM/DL
GLUCOSE SERPL-MCNC: 104 MG/DL (ref 65–99)
HBA1C MFR BLD: 6.2 % (ref 4.8–5.6)
HCT VFR BLD AUTO: 42 % (ref 34–46.6)
HDLC SERPL-MCNC: 57 MG/DL (ref 40–60)
HGB BLD-MCNC: 14.2 G/DL (ref 12–15.9)
IMM GRANULOCYTES # BLD AUTO: 0.02 10*3/MM3 (ref 0–0.05)
IMM GRANULOCYTES NFR BLD AUTO: 0.3 % (ref 0–0.5)
LDLC SERPL CALC-MCNC: 188 MG/DL (ref 0–100)
LYMPHOCYTES # BLD AUTO: 2.73 10*3/MM3 (ref 0.7–3.1)
LYMPHOCYTES NFR BLD AUTO: 40.7 % (ref 19.6–45.3)
MCH RBC QN AUTO: 29 PG (ref 26.6–33)
MCHC RBC AUTO-ENTMCNC: 33.8 G/DL (ref 31.5–35.7)
MCV RBC AUTO: 85.9 FL (ref 79–97)
MONOCYTES # BLD AUTO: 0.53 10*3/MM3 (ref 0.1–0.9)
MONOCYTES NFR BLD AUTO: 7.9 % (ref 5–12)
NEUTROPHILS # BLD AUTO: 3.18 10*3/MM3 (ref 1.7–7)
NEUTROPHILS NFR BLD AUTO: 47.4 % (ref 42.7–76)
NRBC BLD AUTO-RTO: 0.1 /100 WBC (ref 0–0.2)
PLATELET # BLD AUTO: 240 10*3/MM3 (ref 140–450)
POTASSIUM SERPL-SCNC: 4.4 MMOL/L (ref 3.5–5.2)
PROT SERPL-MCNC: 8.4 G/DL (ref 6–8.5)
RBC # BLD AUTO: 4.89 10*6/MM3 (ref 3.77–5.28)
SODIUM SERPL-SCNC: 139 MMOL/L (ref 136–145)
T4 FREE SERPL-MCNC: 0.87 NG/DL (ref 0.93–1.7)
TRIGL SERPL-MCNC: 230 MG/DL (ref 0–150)
TSH SERPL DL<=0.005 MIU/L-ACNC: 8.37 UIU/ML (ref 0.27–4.2)
VLDLC SERPL CALC-MCNC: 44 MG/DL (ref 5–40)
WBC # BLD AUTO: 6.71 10*3/MM3 (ref 3.4–10.8)

## 2022-05-20 RX ORDER — ATORVASTATIN CALCIUM 20 MG/1
20 TABLET, FILM COATED ORAL NIGHTLY
Qty: 90 TABLET | Refills: 1 | Status: SHIPPED | OUTPATIENT
Start: 2022-05-20 | End: 2022-07-08 | Stop reason: SDUPTHER

## 2022-05-20 RX ORDER — LEVOTHYROXINE SODIUM 50 MCG
50 TABLET ORAL DAILY
Qty: 90 TABLET | Refills: 1 | Status: SHIPPED | OUTPATIENT
Start: 2022-05-20 | End: 2022-07-08 | Stop reason: ALTCHOICE

## 2022-05-20 RX ORDER — LEVOTHYROXINE SODIUM 88 UG/1
88 TABLET ORAL DAILY
Qty: 90 TABLET | Refills: 1 | Status: SHIPPED | OUTPATIENT
Start: 2022-05-20 | End: 2022-05-20

## 2022-05-21 NOTE — PROGRESS NOTES
Cholesterol is elevated, going to send medication to pharmacy, take nightly.   A1C prediabetic range, sending metformin to start daily in the mornings, take with breakfast.  Thyroid is abnormal, will send medication as discussed during our visit.  Kidney function is decreased, will continue to monitor.   Encourage diet changes and 30 minutes of exercise daily.

## 2022-05-23 DIAGNOSIS — R73.03 PREDIABETES: Primary | ICD-10-CM

## 2022-05-23 RX ORDER — METFORMIN HYDROCHLORIDE 500 MG/1
500 TABLET, EXTENDED RELEASE ORAL
Qty: 90 TABLET | Refills: 1 | Status: SHIPPED | OUTPATIENT
Start: 2022-05-23 | End: 2022-07-08 | Stop reason: SDUPTHER

## 2022-05-27 ENCOUNTER — TELEPHONE (OUTPATIENT)
Dept: INTERNAL MEDICINE | Facility: CLINIC | Age: 72
End: 2022-05-27

## 2022-05-27 NOTE — TELEPHONE ENCOUNTER
Pharmacy Name: [facility] - [facility] [address] - [phone]  - [phone]      Pharmacy representative name: ROBIN    Pharmacy representative phone number:[phone]    What medication are you calling in regards to:levETIRAcetam (Keppra) 100 MG/ML solution    What question does the pharmacy have: IS THE PATIENT TAKING THE SOLUTION OR TABLETS     Who is the provider that prescribed the medication: HANNY ZULUAGA

## 2022-07-08 ENCOUNTER — OFFICE VISIT (OUTPATIENT)
Dept: INTERNAL MEDICINE | Facility: CLINIC | Age: 72
End: 2022-07-08

## 2022-07-08 ENCOUNTER — PRIOR AUTHORIZATION (OUTPATIENT)
Dept: INTERNAL MEDICINE | Facility: CLINIC | Age: 72
End: 2022-07-08

## 2022-07-08 VITALS
DIASTOLIC BLOOD PRESSURE: 74 MMHG | HEART RATE: 82 BPM | TEMPERATURE: 98.4 F | BODY MASS INDEX: 31.84 KG/M2 | SYSTOLIC BLOOD PRESSURE: 120 MMHG | OXYGEN SATURATION: 97 % | HEIGHT: 69 IN | WEIGHT: 215 LBS | RESPIRATION RATE: 16 BRPM

## 2022-07-08 DIAGNOSIS — I10 ESSENTIAL HYPERTENSION: ICD-10-CM

## 2022-07-08 DIAGNOSIS — E03.9 HYPOTHYROIDISM, UNSPECIFIED TYPE: ICD-10-CM

## 2022-07-08 DIAGNOSIS — Z11.59 ENCOUNTER FOR HEPATITIS C SCREENING TEST FOR LOW RISK PATIENT: ICD-10-CM

## 2022-07-08 DIAGNOSIS — E78.2 MIXED HYPERLIPIDEMIA: ICD-10-CM

## 2022-07-08 DIAGNOSIS — L72.0 EPIDERMAL CYST OF NECK: ICD-10-CM

## 2022-07-08 DIAGNOSIS — Z12.11 ENCOUNTER FOR SCREENING COLONOSCOPY: ICD-10-CM

## 2022-07-08 DIAGNOSIS — Z23 NEED FOR VACCINATION: ICD-10-CM

## 2022-07-08 DIAGNOSIS — Z00.00 MEDICARE ANNUAL WELLNESS VISIT, SUBSEQUENT: Primary | ICD-10-CM

## 2022-07-08 DIAGNOSIS — R73.03 PREDIABETES: ICD-10-CM

## 2022-07-08 DIAGNOSIS — H61.23 IMPACTED CERUMEN OF BOTH EARS: ICD-10-CM

## 2022-07-08 DIAGNOSIS — R56.9 SEIZURES: ICD-10-CM

## 2022-07-08 LAB
ALBUMIN SERPL-MCNC: 4.3 G/DL (ref 3.5–5.2)
ALBUMIN/GLOB SERPL: 1.1 G/DL
ALP SERPL-CCNC: 96 U/L (ref 39–117)
ALT SERPL-CCNC: 18 U/L (ref 1–33)
AST SERPL-CCNC: 21 U/L (ref 1–32)
BILIRUB SERPL-MCNC: 0.7 MG/DL (ref 0–1.2)
BUN SERPL-MCNC: 9 MG/DL (ref 8–23)
BUN/CREAT SERPL: 8.9 (ref 7–25)
CALCIUM SERPL-MCNC: 9.9 MG/DL (ref 8.6–10.5)
CHLORIDE SERPL-SCNC: 101 MMOL/L (ref 98–107)
CHOLEST SERPL-MCNC: 152 MG/DL (ref 0–200)
CO2 SERPL-SCNC: 28.6 MMOL/L (ref 22–29)
CREAT SERPL-MCNC: 1.01 MG/DL (ref 0.57–1)
EGFRCR SERPLBLD CKD-EPI 2021: 60 ML/MIN/1.73
GLOBULIN SER CALC-MCNC: 4 GM/DL
GLUCOSE SERPL-MCNC: 98 MG/DL (ref 65–99)
HDLC SERPL-MCNC: 62 MG/DL (ref 40–60)
LDLC SERPL CALC-MCNC: 68 MG/DL (ref 0–100)
POTASSIUM SERPL-SCNC: 4.1 MMOL/L (ref 3.5–5.2)
PROT SERPL-MCNC: 8.3 G/DL (ref 6–8.5)
SODIUM SERPL-SCNC: 140 MMOL/L (ref 136–145)
T4 FREE SERPL-MCNC: 0.92 NG/DL (ref 0.93–1.7)
TRIGL SERPL-MCNC: 126 MG/DL (ref 0–150)
TSH SERPL DL<=0.005 MIU/L-ACNC: 7.32 UIU/ML (ref 0.27–4.2)
VLDLC SERPL CALC-MCNC: 22 MG/DL (ref 5–40)

## 2022-07-08 PROCEDURE — 1170F FXNL STATUS ASSESSED: CPT | Performed by: NURSE PRACTITIONER

## 2022-07-08 PROCEDURE — G0439 PPPS, SUBSEQ VISIT: HCPCS | Performed by: NURSE PRACTITIONER

## 2022-07-08 PROCEDURE — 1159F MED LIST DOCD IN RCRD: CPT | Performed by: NURSE PRACTITIONER

## 2022-07-08 PROCEDURE — 69210 REMOVE IMPACTED EAR WAX UNI: CPT | Performed by: NURSE PRACTITIONER

## 2022-07-08 PROCEDURE — 96160 PT-FOCUSED HLTH RISK ASSMT: CPT | Performed by: NURSE PRACTITIONER

## 2022-07-08 RX ORDER — MULTIPLE VITAMINS W/ MINERALS TAB 9MG-400MCG
TAB ORAL
COMMUNITY

## 2022-07-08 RX ORDER — ATORVASTATIN CALCIUM 20 MG/1
TABLET, FILM COATED ORAL
COMMUNITY
End: 2022-09-02 | Stop reason: SDUPTHER

## 2022-07-08 RX ORDER — METFORMIN HYDROCHLORIDE 500 MG/1
TABLET, FILM COATED, EXTENDED RELEASE ORAL
COMMUNITY
End: 2022-09-02 | Stop reason: SDUPTHER

## 2022-07-08 RX ORDER — LEVOTHYROXINE AND LIOTHYRONINE 9.5; 2.25 UG/1; UG/1
15 TABLET ORAL DAILY
Qty: 30 TABLET | Refills: 1 | Status: SHIPPED | OUTPATIENT
Start: 2022-07-08 | End: 2022-09-06

## 2022-07-08 RX ORDER — LEVETIRACETAM 500 MG/1
1 TABLET ORAL 2 TIMES DAILY
COMMUNITY
Start: 2022-05-06 | End: 2022-07-08 | Stop reason: ALTCHOICE

## 2022-07-08 RX ORDER — LEVOTHYROXINE SODIUM 88 UG/1
44 TABLET ORAL
COMMUNITY
End: 2022-07-08 | Stop reason: SINTOL

## 2022-07-08 NOTE — PROGRESS NOTES
Subsequent Medicare Wellness Visit    Chief Complaint   Patient presents with   • Medicare Wellness-subsequent     Discuss medication side effects      Subjective    History of Present Illness:  Nicci Mojica is a 71 y.o. female who presents for a Subsequent Medicare Wellness Visit.    Patient continues to have side effects from her euthyroid medication.  She is taking half of her dose currently and is still having symptoms.  Symptoms include nausea, headache, inability to sleep.  We discussed this last visit and changed the brand, but this did not help.  May need to do a trial of [place] Thyroid to see if this is beneficial.  Patient states she does not think she can continue on this brand of medication and cannot tolerate the name brand Synthroid either.  Patient reports she has had trouble hearing, especially out of right ear.     The following portions of the patient's history were reviewed and   updated as appropriate: allergies, current medications, past family history, past medical history, past social history, past surgical history and problem list.    Compared to one year ago, the patient feels her physical   health is the same.    Compared to one year ago, the patient feels her mental   health is better.      Recent Hospitalizations:  She was not admitted to the hospital during the last year.       Current Medical Providers:  Patient Care Team:  Magdalene Strauss APRN as PCP - General (Family Medicine)  Pato Ramirez MD as Consulting Physician (General Surgery)    Outpatient Medications Prior to Visit   Medication Sig Dispense Refill   • atorvastatin (LIPITOR) 20 MG tablet atorvastatin 20 mg tablet   Take 1 tablet every day by oral route.     • hydroCHLOROthiazide (HYDRODIURIL) 12.5 MG tablet Take 1 tablet by mouth Daily for 90 days. 90 tablet 0   • metFORMIN (GLUMETZA) 500 MG (MOD) 24 hr tablet metformin  mg 24 hr tablet,extended release   Take 1 tablet every day by oral route.     • multivitamin  with minerals tablet tablet One A Day W/Iron     • levETIRAcetam (Keppra) 100 MG/ML solution Take 5 mL twice a day 300 mL 5   • levETIRAcetam (KEPPRA) 500 MG tablet Take 1 tablet by mouth 2 (Two) Times a Day.     • levothyroxine (SYNTHROID, LEVOTHROID) 88 MCG tablet 44 mcg. Patient states she takes a half tablet every night due to side effects     • atorvastatin (LIPITOR) 20 MG tablet Take 1 tablet by mouth Every Night. 90 tablet 1   • metFORMIN ER (Glucophage XR) 500 MG 24 hr tablet Take 1 tablet by mouth Daily With Breakfast. 90 tablet 1   • multivitamin with minerals tablet tablet Take 1 tablet by mouth Daily.     • Synthroid 50 MCG tablet Take 1 tablet by mouth Daily. 90 tablet 1     No facility-administered medications prior to visit.       No opioid medication identified on active medication list. I have reviewed chart for other potential  high risk medication/s and harmful drug interactions in the elderly.          Aspirin is not on active medication list.  Aspirin use is not indicated based on review of current medical condition/s. Risk of harm outweighs potential benefits.  . Patient has a history of a GI bleed and benefits outweigh the risks, she is not interested in taking Asprin due to this risk.     Patient Active Problem List   Diagnosis   • Hypothyroidism   • Prediabetes   • Mixed hyperlipidemia   • Essential hypertension   • Seizures (HCC)     Advance Care Planning  Advance Directive is not on file.  ACP discussion was held with the patient during this visit. Patient does not have an advance directive, information provided.    Review of Systems   Constitutional: Negative.    HENT: Negative.    Eyes: Negative.    Respiratory: Negative.    Cardiovascular: Negative.    Gastrointestinal: Positive for nausea (after taking thyroid med). Negative for diarrhea and vomiting.   Endocrine: Negative.    Genitourinary: Negative.    Musculoskeletal: Negative.    Skin:        Cyst neck   Allergic/Immunologic:  "Positive for immunocompromised state.   Neurological: Negative.    Psychiatric/Behavioral: Positive for sleep disturbance (since taking thyroid med). Negative for agitation, dysphoric mood, hallucinations, self-injury and suicidal ideas. The patient is not nervous/anxious.         Objective    Vitals:    07/08/22 0958   BP: 120/74   Pulse: 82   Resp: 16   Temp: 98.4 °F (36.9 °C)   TempSrc: Temporal   SpO2: 97%   Weight: 97.5 kg (215 lb)   Height: 175.3 cm (69\")   PainSc: 0-No pain     Estimated body mass index is 31.75 kg/m² as calculated from the following:    Height as of this encounter: 175.3 cm (69\").    Weight as of this encounter: 97.5 kg (215 lb).    BMI is >= 30 and <35. (Class 1 Obesity). The following options were offered after discussion;: exercise counseling/recommendations and nutrition counseling/recommendations      Does the patient have evidence of cognitive impairment? No    Physical Exam  Vitals and nursing note reviewed.   Constitutional:       Appearance: Normal appearance. She is obese.   HENT:      Head: Normocephalic and atraumatic.      Right Ear: Decreased hearing noted. There is impacted cerumen (unable to visualize TM).      Left Ear: Tympanic membrane normal. There is impacted cerumen (TM visible).      Nose: Nose normal.      Mouth/Throat:      Mouth: Mucous membranes are moist.      Pharynx: Oropharynx is clear.   Eyes:      Extraocular Movements: Extraocular movements intact.      Conjunctiva/sclera: Conjunctivae normal.      Pupils: Pupils are equal, round, and reactive to light.   Neck:      Thyroid: No thyromegaly.      Vascular: No carotid bruit.   Cardiovascular:      Rate and Rhythm: Normal rate and regular rhythm.      Pulses: Normal pulses.      Heart sounds: Normal heart sounds.   Pulmonary:      Effort: Pulmonary effort is normal.      Breath sounds: Normal breath sounds.   Abdominal:      General: Abdomen is flat. Bowel sounds are normal.      Palpations: Abdomen is soft. "   Musculoskeletal:         General: Normal range of motion.      Cervical back: Normal range of motion and neck supple.   Lymphadenopathy:      Cervical: No cervical adenopathy.   Skin:     General: Skin is warm and dry.      Capillary Refill: Capillary refill takes less than 2 seconds.      Comments: Small cyst 1mm in size to right side of neck, no erythema, warmth, drainage   Neurological:      General: No focal deficit present.      Mental Status: She is alert and oriented to person, place, and time.      Cranial Nerves: No cranial nerve deficit.      Motor: No weakness.      Gait: Gait normal.   Psychiatric:         Mood and Affect: Mood normal.         Behavior: Behavior normal.         Thought Content: Thought content normal.         Judgment: Judgment normal.     Cerumen Removal    Date/Time: 7/8/2022 5:53 PM  Performed by: Freida Mattson LPN  Authorized by: Magdalene Strauss APRN   Consent: Verbal consent obtained.  Consent given by: patient  Patient understanding: patient states understanding of the procedure being performed  Patient identity confirmed: verbally with patient  Location details: left ear and right ear  Patient tolerance: patient tolerated the procedure well with no immediate complications  Comments: Right: cerumen removed, TM visualized post procedure, WNL  Left: some cerumen removed, TM visualized, WNL   Procedure type: instrumentation, irrigation          .  Results Encounter on 07/08/2022   Component Date Value Ref Range Status   • Cologuard 07/13/2022 Negative  Negative Final    Comment:   NEGATIVE TEST RESULT. A negative Cologuard result indicates a low likelihood that a colorectal cancer (CRC) or advanced adenoma (adenomatous polyps with more advanced pre-malignant features)  is present. The chance that a person with a negative Cologuard test has a colorectal cancer is less than 1 in 1500 (negative predictive value >99.9%) or has an  advanced adenoma is less than  5.3% (negative  predictive value 94.7%). These data are based on a prospective cross-sectional study of 10,000 individuals at average risk for colorectal cancer who were screened with both Cologuard and colonoscopy. (Martine Castanon al, N Engl J Med 2014;370(14):3337-1421) The normal value (reference range) for this assay is negative.    COLOGUARD RE-SCREENING RECOMMENDATION: Periodic colorectal cancer screening is an important part of preventive healthcare for asymptomatic individuals at average risk for colorectal cancer.  Following a negative Cologuard result, the American Cancer Society and U.S.                            Multi-Society Task Force screening guidelines recommend a Cologuard re-screening interval of 3 years.   References: American Cancer Society Guideline for Colorectal Cancer Screening: https://www.cancer.org/cancer/colon-rectal-cancer/femckljlt-csfpivcgj-refraaq/acs-recommendations.html.; Stan GRECO, Derick ESTEBAN, Denny GARRETTK, Colorectal Cancer Screening: Recommendations for Physicians and Patients from the U.S. Multi-Society Task Force on Colorectal Cancer Screening , Am J Gastroenterology 2017; 112:9209-8905.    TEST DESCRIPTION: Composite algorithmic analysis of stool DNA-biomarkers with hemoglobin immunoassay.   Quantitative values of individual biomarkers are not reportable and are not associated with individual biomarker result reference ranges. Cologuard is intended for colorectal cancer screening of adults of either sex, 45 years or older, who are at average-risk for colorectal cancer (CRC). Cologuard has been approved for use by the U.S. FDA. The performance of Cologuard was                            established in a cross sectional study of average-risk adults aged 50-84. Cologuard performance in patients ages 45 to 49 years was estimated by sub-group analysis of near-age groups. Colonoscopies performed for a positive result may find as the most clinically significant lesion: colorectal cancer [4.0%],  advanced adenoma (including sessile serrated polyps greater than or equal to 1cm diameter) [20%] or non- advanced adenoma [31%]; or no colorectal neoplasia [45%]. These estimates are derived from a prospective cross-sectional screening study of 10,000 individuals at average risk for colorectal cancer who were screened with both Cologuard and colonoscopy. (Martine Castanon al, N Engl J Med 2014;370(14):9534-6356.) Cologuard may produce a false negative or false positive result (no colorectal cancer or precancerous polyp present at colonoscopy follow up). A negative Cologuard test result does not guarantee the absence of CRC or advanced adenoma (pre-cancer). The current Cologuard                            screening interval is every 3 years. (American Cancer Society and U.S. Multi-Society Task Force). Cologuard performance data in a 10,000 patient pivotal study using colonoscopy as the reference method can be accessed at the following location: www.[email or web address]/results. Additional description of the Cologuard test process, warnings and precautions can be found at [email or web address].     Office Visit on 07/08/2022   Component Date Value Ref Range Status   • Glucose 07/08/2022 98  65 - 99 mg/dL Final   • BUN 07/08/2022 9  8 - 23 mg/dL Final   • Creatinine 07/08/2022 1.01 (A) 0.57 - 1.00 mg/dL Final   • EGFR Result 07/08/2022 60.0 (A) >60.0 mL/min/1.73 Final    Comment: National Kidney Foundation and American Society of  Nephrology (ASN) Task Force recommended calculation based  on the Chronic Kidney Disease Epidemiology Collaboration  (CKD-EPI) equation refit without adjustment for race.  GFR Normal >60  Chronic Kidney Disease <60  Kidney Failure <15     • BUN/Creatinine Ratio 07/08/2022 8.9  7.0 - 25.0 Final   • Sodium 07/08/2022 140  136 - 145 mmol/L Final   • Potassium 07/08/2022 4.1  3.5 - 5.2 mmol/L Final   • Chloride 07/08/2022 101  98 - 107 mmol/L Final   • Total CO2 07/08/2022 28.6  22.0 - 29.0 mmol/L Final   •  Calcium 07/08/2022 9.9  8.6 - 10.5 mg/dL Final   • Total Protein 07/08/2022 8.3  6.0 - 8.5 g/dL Final   • Albumin 07/08/2022 4.30  3.50 - 5.20 g/dL Final   • Globulin 07/08/2022 4.0  gm/dL Final   • A/G Ratio 07/08/2022 1.1  g/dL Final   • Total Bilirubin 07/08/2022 0.7  0.0 - 1.2 mg/dL Final   • Alkaline Phosphatase 07/08/2022 96  39 - 117 U/L Final   • AST (SGOT) 07/08/2022 21  1 - 32 U/L Final   • ALT (SGPT) 07/08/2022 18  1 - 33 U/L Final   • TSH 07/08/2022 7.320 (A) 0.270 - 4.200 uIU/mL Final   • Free T4 07/08/2022 0.92 (A) 0.93 - 1.70 ng/dL Final    Results may be falsely increased if patient taking Biotin.   • Total Cholesterol 07/08/2022 152  0 - 200 mg/dL Final    Comment: Cholesterol Reference Ranges  (U.S. Department of Health and Human Services ATP III  Classifications)  Desirable          <200 mg/dL  Borderline High    200-239 mg/dL  High Risk          >240 mg/dL  Triglyceride Reference Ranges  (U.S. Department of Health and Human Services ATP III  Classifications)  Normal           <150 mg/dL  Borderline High  150-199 mg/dL  High             200-499 mg/dL  Very High        >500 mg/dL  HDL Reference Ranges  (U.S. Department of Health and Human Services ATP III  Classifications)  Low     <40 mg/dl (major risk factor for CHD)  High    >60 mg/dl ('negative' risk factor for CHD)  LDL Reference Ranges  (U.S. Department of Health and Human Services ATP III  Classifications)  Optimal          <100 mg/dL  Near Optimal     100-129 mg/dL  Borderline High  130-159 mg/dL  High             160-189 mg/dL  Very High        >189 mg/dL     • Triglycerides 07/08/2022 126  0 - 150 mg/dL Final   • HDL Cholesterol 07/08/2022 62 (A) 40 - 60 mg/dL Final   • VLDL Cholesterol [place] 07/08/2022 22  5 - 40 mg/dL Final   • LDL Chol Calc (NIH) 07/08/2022 68  0 - 100 mg/dL Final          HEALTH RISK ASSESSMENT    Smoking Status:  Social History     Tobacco Use   Smoking Status Never Smoker   Smokeless Tobacco Never Used     Alcohol  Consumption:  Social History     Substance and Sexual Activity   Alcohol Use Defer     Fall Risk Screen:    ROMARIOADI Fall Risk Assessment was completed, and patient is at LOW risk for falls.Assessment completed on:5/2/2022    Depression Screening:  PHQ-2/PHQ-9 Depression Screening 7/8/2022   Little Interest or Pleasure in Doing Things 0-->not at all   Feeling Down, Depressed or Hopeless 1-->several days   Trouble Falling or Staying Asleep, or Sleeping Too Much -   Feeling Tired or Having Little Energy -   Poor Appetite or Overeating -   Feeling Bad about Yourself - or that You are a Failure or Have Let Yourself or Your Family Down -   Trouble Concentrating on Things, Such as Reading the Newspaper or Watching Television -   Moving or Speaking So Slowly that Other People Could Have Noticed? Or the Opposite - Being So Fidgety -   Thoughts that You Would be Better Off Dead or of Hurting Yourself in Some Way -   PHQ-9: Brief Depression Severity Measure Score 1   If You Checked Off Any Problems, How Difficult Have These Problems Made It For You to Do Your Work, Take Care of Things at Home, or Get Along with Other People? -       Health Habits and Functional and Cognitive Screening:  Functional & Cognitive Status 7/8/2022   Do you have difficulty preparing food and eating? No   Do you have difficulty bathing yourself, getting dressed or grooming yourself? No   Do you have difficulty using the toilet? No   Do you have difficulty moving around from place to place? No   Do you have trouble with steps or getting out of a bed or a chair? No   Current Diet Well Balanced Diet   Dental Exam Not up to date   Eye Exam Up to date   Exercise (times per week) 7 times per week   Current Exercises Include House Cleaning;Other        Exercise Comment up an down stairs at home, care taker of , going to try to get some exercise equipment at home   Do you need help using the phone?  No   Are you deaf or do you have serious difficulty  hearing?  No   Do you need help with transportation? Yes   Do you need help shopping? No   Do you need help preparing meals?  No   Do you need help with housework?  No   Do you need help with laundry? No   Do you need help taking your medications? No   Do you need help managing money? No   Do you ever drive or ride in a car without wearing a seat belt? No   Have you felt unusual stress, anger or loneliness in the last month? Yes   Who do you live with? Spouse   If you need help, do you have trouble finding someone available to you? No   Have you been bothered in the last four weeks by sexual problems? Yes   Do you have difficulty concentrating, remembering or making decisions? No       Age-appropriate Screening Schedule:  Refer to the list below for future screening recommendations based on patient's age, sex and/or medical conditions. Orders for these recommended tests are listed in the plan section. The patient has been provided with a written plan.    Health Maintenance   Topic Date Due   • DXA SCAN  Never done; scheduled next month   • TDAP/TD VACCINES (1 - Tdap) 07/08/2022 (Originally 10/4/1969); due to insurance will obtain at pharmacy    • ZOSTER VACCINE (1 of 2) 07/08/2023 (Originally 10/4/2001); due to insurance will obtain at pharmacy   • MAMMOGRAM  08/04/2022; received a letter and going to call to schedule; does not need referral placed    • LIPID PANEL  05/19/2023        Assessment & Plan      Risk Factors Identified During Encounter  Cardiovascular Disease  Hearing Problem  Obesity/Overweight   The above risks/problems have been discussed with the patient.  Follow up actions/plans if indicated are seen below in the Assessment/Plan Section.  Pertinent information has been shared with the patient in the After Visit Summary.    Diagnoses and all orders for this visit:    1. Medicare annual wellness visit, subsequent (Primary)  -     Comprehensive Metabolic Panel  -     TSH  -     T4, free    2.  Hypothyroidism  -     TSH  -     T4, free  -     thyroid ([place] Thyroid) 15 MG tablet; Take 1 tablet by mouth Daily.  Dispense: 30 tablet; Refill: 1  - Will do trial of [place] Thyroid at the lowest dose.  Take every day in the morning 2 hours apart from other medications or meals.  Will adjust dose as needed slowly to avoid side effects.  Want you to return in 8 weeks to have thyroid levels rechecked.    3. Prediabetes  -     Patient is taking 500 mg of metformin daily in the morning with breakfast.  Denies any side effects.  Will periodically check her A1c to ensure improvement.  Patient continues to monitor diet and exercise 7 days a week.    4. Mixed hyperlipidemia  -     Lipid Panel  - Based on last labs, patient was started on atorvastatin 20 mg.  Reports she is taking it every night.  Denies any abdominal pain, jaundice, muscle aches or spasms.  Will recheck labs today to ensure liver function is adequate since initiation.    5. Essential hypertension  -     Comprehensive Metabolic Panel  - Controlled. Continue current medications as prescribed. Recommend DASH diet, continuing exercise, medication compliance, and home blood pressure monitoring.     6. Seizures (HCC)  -     Last seizure was 1/1/2020.  It was a nocturnal seizure.  MRI of brain on 1/16/2018 showed normal age-related changes.  She takes Keppra 500 mg daily.  She has to take the liquid version because she is unable to swallow the tablets.  Has an appointment 8/11/2022 with [facility] to establish care. Was referred by her current neurologist who is retiring.    7. Need for vaccination  -     Patient is to obtain vaccinations at the pharmacy due to her insurance coverage.    8. Encounter for screening colonoscopy  -     Cologuard - Stool, Per Rectum; Future    9. Encounter for hepatitis C screening test for low risk patient  -     Hepatitis C antibody    10. Impacted cerumen of both ears  -     Cerumen Removal  - Cerumen removed  completely from the right canal.  Left canal still has remaining cerumen that was unable to be removed but TM is visible.  Recommend Debrox over-the-counter eardrops.  Can return for another ear flush as needed after Debrox loosens the wax.    11. Epidermal cyst of neck  -     Has appointment with Dermatologist next month to establish care/possible removal of cyst.     Follow Up:   Return in about 8 weeks (around 9/2/2022) for Thyroid.     An After Visit Summary and PPPS were made available to the patient.

## 2022-07-08 NOTE — TELEPHONE ENCOUNTER
Prior Auth initiated for NP Thyroid 15 MG via Covermymeds.  Awaiting determination.    Key:  [id number]

## 2022-07-11 NOTE — PROGRESS NOTES
Kidney function improved slightly, thyroid levels improved by 1 point. Will continue to monitor every 6 weeks since starting new medicine. Cholesterol is much improved. I will see you at next appointment.

## 2022-07-12 ENCOUNTER — TRANSCRIBE ORDERS (OUTPATIENT)
Dept: INTERNAL MEDICINE | Facility: CLINIC | Age: 72
End: 2022-07-12

## 2022-07-12 DIAGNOSIS — Z12.31 VISIT FOR SCREENING MAMMOGRAM: Primary | ICD-10-CM

## 2022-08-01 DIAGNOSIS — I10 ESSENTIAL HYPERTENSION: ICD-10-CM

## 2022-08-01 RX ORDER — HYDROCHLOROTHIAZIDE 12.5 MG/1
12.5 TABLET ORAL DAILY
Qty: 90 TABLET | Refills: 0 | Status: SHIPPED | OUTPATIENT
Start: 2022-08-01 | End: 2022-09-02 | Stop reason: SDUPTHER

## 2022-08-01 NOTE — TELEPHONE ENCOUNTER
Caller: Nicci Mojica    Relationship: Self    Best call back number:[phone]  Requested Prescriptions:   Requested Prescriptions     Pending Prescriptions Disp Refills   • hydroCHLOROthiazide (HYDRODIURIL) 12.5 MG tablet 90 tablet 0     Sig: Take 1 tablet by mouth Daily for 90 days.        Pharmacy where request should be sent: [facility] [address] [place] [address] [phone] [facility] [phone] FX     Additional details provided by patient:     Does the patient have less than a 3 day supply:  [x] Yes  [] No    Pavan Lisa Rep   08/01/22 09:44 EDT

## 2022-08-03 ENCOUNTER — APPOINTMENT (OUTPATIENT)
Dept: BONE DENSITY | Facility: HOSPITAL | Age: 72
End: 2022-08-03

## 2022-08-03 DIAGNOSIS — M81.8 OTHER OSTEOPOROSIS WITHOUT CURRENT PATHOLOGICAL FRACTURE: ICD-10-CM

## 2022-08-03 DIAGNOSIS — Z87.39: ICD-10-CM

## 2022-08-03 PROCEDURE — 77080 DXA BONE DENSITY AXIAL: CPT

## 2022-08-09 NOTE — PROGRESS NOTES
Osteopenia present. Will discuss at upcoming appointment further. Continue being active with weight bearing exercises.

## 2022-08-11 ENCOUNTER — OFFICE VISIT (OUTPATIENT)
Dept: NEUROLOGY | Facility: CLINIC | Age: 72
End: 2022-08-11

## 2022-08-11 VITALS
HEART RATE: 73 BPM | WEIGHT: 211.8 LBS | TEMPERATURE: 97.3 F | DIASTOLIC BLOOD PRESSURE: 78 MMHG | BODY MASS INDEX: 31.37 KG/M2 | OXYGEN SATURATION: 99 % | SYSTOLIC BLOOD PRESSURE: 122 MMHG | HEIGHT: 69 IN

## 2022-08-11 DIAGNOSIS — Z87.820 HISTORY OF CLOSED HEAD INJURY: ICD-10-CM

## 2022-08-11 DIAGNOSIS — R25.8 BRADYKINESIA: ICD-10-CM

## 2022-08-11 DIAGNOSIS — G40.909 SEIZURE DISORDER: Primary | ICD-10-CM

## 2022-08-11 DIAGNOSIS — R51.9 NONINTRACTABLE EPISODIC HEADACHE, UNSPECIFIED HEADACHE TYPE: ICD-10-CM

## 2022-08-11 PROCEDURE — 99214 OFFICE O/P EST MOD 30 MIN: CPT | Performed by: NURSE PRACTITIONER

## 2022-08-11 RX ORDER — LEVOTHYROXINE SODIUM 88 UG/1
44 TABLET ORAL
COMMUNITY
Start: 2022-07-12 | End: 2022-09-02

## 2022-08-11 RX ORDER — LEVETIRACETAM 100 MG/ML
10 SOLUTION ORAL 2 TIMES DAILY
COMMUNITY
End: 2022-08-11 | Stop reason: SDUPTHER

## 2022-08-11 RX ORDER — LEVETIRACETAM 500 MG/5ML
500 INJECTION, SOLUTION, CONCENTRATE INTRAVENOUS 2 TIMES DAILY
COMMUNITY
End: 2022-08-11

## 2022-08-11 RX ORDER — LEVETIRACETAM 100 MG/ML
SOLUTION ORAL
Qty: 900 ML | Refills: 1 | Status: SHIPPED | OUTPATIENT
Start: 2022-08-11 | End: 2022-10-10 | Stop reason: SDUPTHER

## 2022-08-11 NOTE — PROGRESS NOTES
"     New Patient Office Visit      Patient Name: Nicci Mojica  : 1950   MRN: [id number]     Referring Physician: Otoniel Flowers MD    Chief Complaint:    Chief Complaint   Patient presents with   • Consult     New patient in office to establish care for seizures. Patient states her seizures started when she was 9 years old. Patient stated last seizure was in        History of Present Illness: Nicci Mojica is a 71 y.o. female who is here today to establish care with Neurology for seizure disorder.  She is retired and lives with her .  She is taking Keppra liquid 100mg/ml, 5 ml BID and reports good toleration and compliance- she had difficulty swallowing tablets.  Her most recent seizure was in .  She says she was going through \"a lot physically at the time\".  She does not drive.  She does have headaches about every other day- sometimes awakens with headaches.  Denies ever being diagnosed with JÚNIOR.  Says she sleeps well.  She graduated from high school and was in normal classes.  She has attended college.  She was the product of a full term birth.  She denies alcohol or illegal drug use.  Additional risk factors- BMI 31, hypothyroidism, dyslipidemia, diabetes.   *Previously seen by Dr. Otoniel Flowers, neurologist.      Pertinent Medical History:  Previous records from Dr. Otoniel Flowers reviewed at time of visit.  According to documentation, she has a long history of seizures after a head injury with loss of consciousness, after being struck in the head by a swing as a child.  She has \"staring\" seizures and has had convulsive nocturnal activity not associated with tongue bites, urinary incontinence or a post ictal period.  Most recent nocturnal seizure was in 2020.  EEG on 2017 was normal.  Brain MRI on 2018 showed age-related changes.     Subjective      Review of Systems:   Review of Systems   Constitutional: Negative for fatigue, fever, unexpected weight gain and unexpected " weight loss.   HENT: Negative for hearing loss, sore throat, swollen glands, tinnitus and trouble swallowing.    Eyes: Negative for blurred vision, double vision, photophobia and visual disturbance.   Respiratory: Negative for cough, chest tightness and shortness of breath.    Cardiovascular: Negative for chest pain, palpitations and leg swelling.   Gastrointestinal: Negative for constipation, diarrhea and nausea.   Endocrine: Negative for cold intolerance and heat intolerance.   Musculoskeletal: Negative for gait problem, neck pain and neck stiffness.   Skin: Negative for color change and rash.   Allergic/Immunologic: Negative for environmental allergies and food allergies.   Neurological: Positive for seizures and headache. Negative for dizziness, syncope, facial asymmetry, speech difficulty, weakness, memory problem and confusion.   Psychiatric/Behavioral: Negative for agitation, behavioral problems and depressed mood. The patient is not nervous/anxious.        Past Medical History:   Past Medical History:   Diagnosis Date   • Anemia    • Anxiety    • Asthma    • Bleeding disorder (HCC)    • Depression    • Disease of thyroid gland    • Fibroid    • Hyperlipidemia    • Migraine    • Multiple gestation    • Osteoarthritis    • Ovarian cyst    • Polycystic ovary syndrome    • Seizures (HCC)        Past Surgical History:   Past Surgical History:   Procedure Laterality Date   •  SECTION     • KNEE SURGERY     • OOPHORECTOMY Bilateral        Family History:   Family History   Problem Relation Age of Onset   • Hypertension Mother    • Cervical cancer Mother    • Ovarian cancer Mother    • Osteoporosis Mother    • Stroke Mother    • Hypertension Father    • Colon cancer Father    • Diabetes Maternal Grandmother    • Coronary artery disease Maternal Grandmother    • Hyperlipidemia Maternal Grandmother    • Pulmonary embolism Maternal Grandmother    • Diabetes Maternal Grandfather    • Coronary artery disease  "Maternal Grandfather    • Hyperlipidemia Maternal Grandfather    • Diabetes Paternal Grandmother    • Coronary artery disease Paternal Grandmother    • Hyperlipidemia Paternal Grandmother    • Diabetes Paternal Grandfather    • Coronary artery disease Paternal Grandfather    • Hyperlipidemia Paternal Grandfather        Social History:   Social History     Socioeconomic History   • Marital status:    Tobacco Use   • Smoking status: Never Smoker   • Smokeless tobacco: Never Used   Vaping Use   • Vaping Use: Never used   Substance and Sexual Activity   • Alcohol use: Defer   • Drug use: Never   • Sexual activity: Yes     Partners: Male     Birth control/protection: Post-menopausal       Medications:     Current Outpatient Medications:   •  atorvastatin (LIPITOR) 20 MG tablet, atorvastatin 20 mg tablet  Take 1 tablet every day by oral route., Disp: , Rfl:   •  Euthyrox 88 MCG tablet, , Disp: , Rfl:   •  hydroCHLOROthiazide (HYDRODIURIL) 12.5 MG tablet, Take 1 tablet by mouth Daily for 90 days., Disp: 90 tablet, Rfl: 0  •  metFORMIN (GLUMETZA) 500 MG (MOD) 24 hr tablet, metformin  mg 24 hr tablet,extended release  Take 1 tablet every day by oral route., Disp: , Rfl:   •  multivitamin with minerals tablet tablet, One A Day W/Iron, Disp: , Rfl:   •  levETIRAcetam (Keppra) 100 MG/ML solution, 5ml PO BID, Disp: 900 mL, Rfl: 1  •  thyroid ([place] Thyroid) 15 MG tablet, Take 1 tablet by mouth Daily., Disp: 30 tablet, Rfl: 1    Allergies:   Allergies   Allergen Reactions   • Sulfa Antibiotics Hives and Swelling   • Contrast Dye Hives   • Influenza Vaccines Swelling   • Penicillins Hives and Rash       Objective     Physical Exam:  Vital Signs:   Vitals:    08/11/22 1000   BP: 122/78   BP Location: Left arm   Patient Position: Sitting   Cuff Size: Adult   Pulse: 73   Temp: 97.3 °F (36.3 °C)   SpO2: 99%   Weight: 96.1 kg (211 lb 12.8 oz)   Height: 175.3 cm (69\")   PainSc: 0-No pain     Body mass index is 31.28 kg/m². "     Physical Exam  Vitals and nursing note reviewed.   Constitutional:       General: She is not in acute distress.     Appearance: She is well-developed. She is obese. She is not diaphoretic.   HENT:      Head: Normocephalic and atraumatic.   Eyes:      Extraocular Movements: Extraocular movements intact.      Conjunctiva/sclera: Conjunctivae normal.      Pupils: Pupils are equal, round, and reactive to light.   Cardiovascular:      Rate and Rhythm: Normal rate and regular rhythm.      Heart sounds: Normal heart sounds. No murmur heard.    No friction rub. No gallop.   Pulmonary:      Effort: Pulmonary effort is normal. No respiratory distress.      Breath sounds: Normal breath sounds. No wheezing or rales.   Musculoskeletal:         General: Normal range of motion.   Skin:     General: Skin is warm and dry.      Findings: No rash.   Neurological:      Mental Status: She is alert and oriented to person, place, and time.      Coordination: Finger-Nose-Finger Test normal.      Gait: Gait is intact.   Psychiatric:         Mood and Affect: Mood normal.         Behavior: Behavior normal.         Thought Content: Thought content normal.         Judgment: Judgment normal.      Comments: Decreased facial affect; answers questions appropriately but speech seems slow.          Neurologic Exam     Mental Status   Oriented to person, place, and time.   Attention: normal. Concentration: normal.   Level of consciousness: alert  Knowledge: good.     Cranial Nerves   Cranial nerves II through XII intact.     CN II   Visual fields full to confrontation.     CN III, IV, VI   Pupils are equal, round, and reactive to light.  Right pupil: Size: 2 mm. Shape: regular. Reactivity: brisk.   Left pupil: Size: 2 mm. Shape: regular. Reactivity: brisk.   CN III: no CN III palsy  CN VI: no CN VI palsy  Nystagmus: none     CN V   Facial sensation intact.     CN VII   Facial expression full, symmetric.     CN VIII   CN VIII normal.     CN IX, X    CN IX normal.   CN X normal.     CN XI   CN XI normal.     CN XII   CN XII normal.     Motor Exam   Muscle bulk: normal  Overall muscle tone: normal    Strength   Right biceps: 5/5  Left biceps: 5/5  Right triceps: 5/5  Left triceps: 5/5  Right quadriceps: 5/5  Left quadriceps: 5/5  Right hamstrin/5  Left hamstrin/5    Sensory Exam   Light touch normal.   Proprioception normal.     Gait, Coordination, and Reflexes     Gait  Gait: normal    Coordination   Finger to nose coordination: normal    Tremor   Resting tremor: absent  Intention tremor: absent  Action tremor: absent    Reflexes   Reflexes 2+ except as noted. Able to go from sitting to standing position on first attempt without assistance; it takes 4 steps to complete a turn; decreased arm swing.        Assessment / Plan      Assessment/Plan:   Diagnoses and all orders for this visit:    1. Seizure disorder (HCC) (Primary)  -     MRI Brain With & Without Contrast; Future  -     EEG; Future    2. History of closed head injury  -     MRI Brain With & Without Contrast; Future    3. Nonintractable episodic headache, unspecified headache type    4. Bradykinesia    5. BMI 31.0-31.9,adult    Other orders  -     levETIRAcetam (Keppra) 100 MG/ML solution; 5ml PO BID  Dispense: 900 mL; Refill: 1    *Patient education on seizures provided today. Patient does not drive. Seizure precautions discussed and encouraged.   *I have advised patient to have friends and family call 911 for any seizure lasting more than 5 minutes.     Follow Up:   Return in about 2 months (around 10/11/2022) for Follow Up. after having brain MRI and EEG done.     HANNY Garza, FNP-C  [facility]       Please note that portions of this note may have been completed with a voice recognition program. Efforts were made to edit the dictations, but occasionally words are mistranscribed.

## 2022-08-23 ENCOUNTER — HOSPITAL ENCOUNTER (OUTPATIENT)
Dept: SLEEP MEDICINE | Facility: HOSPITAL | Age: 72
End: 2022-08-23

## 2022-09-02 ENCOUNTER — TELEPHONE (OUTPATIENT)
Dept: INTERNAL MEDICINE | Facility: CLINIC | Age: 72
End: 2022-09-02

## 2022-09-02 ENCOUNTER — OFFICE VISIT (OUTPATIENT)
Dept: INTERNAL MEDICINE | Facility: CLINIC | Age: 72
End: 2022-09-02

## 2022-09-02 VITALS
HEIGHT: 69 IN | OXYGEN SATURATION: 99 % | WEIGHT: 209.8 LBS | DIASTOLIC BLOOD PRESSURE: 78 MMHG | TEMPERATURE: 97.5 F | SYSTOLIC BLOOD PRESSURE: 124 MMHG | HEART RATE: 61 BPM | BODY MASS INDEX: 31.07 KG/M2

## 2022-09-02 DIAGNOSIS — R73.03 PREDIABETES: ICD-10-CM

## 2022-09-02 DIAGNOSIS — I10 ESSENTIAL HYPERTENSION: ICD-10-CM

## 2022-09-02 DIAGNOSIS — E78.2 MIXED HYPERLIPIDEMIA: ICD-10-CM

## 2022-09-02 DIAGNOSIS — E03.9 HYPOTHYROIDISM, UNSPECIFIED TYPE: Primary | ICD-10-CM

## 2022-09-02 DIAGNOSIS — L60.2 THICKENED NAILS: ICD-10-CM

## 2022-09-02 PROCEDURE — 99214 OFFICE O/P EST MOD 30 MIN: CPT | Performed by: NURSE PRACTITIONER

## 2022-09-02 RX ORDER — ATORVASTATIN CALCIUM 20 MG/1
20 TABLET, FILM COATED ORAL DAILY
Qty: 90 TABLET | Refills: 1 | Status: SHIPPED | OUTPATIENT
Start: 2022-09-02 | End: 2023-01-31

## 2022-09-02 RX ORDER — HYDROCHLOROTHIAZIDE 12.5 MG/1
12.5 TABLET ORAL DAILY
Qty: 90 TABLET | Refills: 1 | Status: SHIPPED | OUTPATIENT
Start: 2022-09-02 | End: 2022-10-31 | Stop reason: SDUPTHER

## 2022-09-02 RX ORDER — LEVOTHYROXINE SODIUM 50 UG/1
50 CAPSULE ORAL DAILY
Qty: 90 CAPSULE | Refills: 1 | Status: CANCELLED | OUTPATIENT
Start: 2022-09-02

## 2022-09-02 RX ORDER — METFORMIN HYDROCHLORIDE 500 MG/1
500 TABLET, FILM COATED, EXTENDED RELEASE ORAL
Qty: 90 TABLET | Refills: 1 | Status: SHIPPED | OUTPATIENT
Start: 2022-09-02 | End: 2022-10-31 | Stop reason: ALTCHOICE

## 2022-09-02 NOTE — PROGRESS NOTES
Chief Complaint   Patient presents with   • Thyroid Problem     Follow up; surgical incision from 8/30, requesting supplies     Subjective   Nicci Mojica is a 71 y.o. female.     History of Present Illness     Patient presents with thyroid follow up. Was unable to tolerate side effects from euthyroid/synthroid. Started pt on armour thyroid at lowest dose - 15 mg. This medication was too expensive and she did not start it. She has been taking 44 mcg of euthyroid without side effects.  She was referred to dermatology for a cyst on the right side of her neck.  Dermatologist removed 2 cysts and she currently has 5-6 stitches on the right side of neck without infection.  Has a follow-up in the next 10 days to have stitches removed.  Patient states that the dermatologist said that the cyst could have been pushing on her thyroid and this could have been causing her issues with swallowing. She is no longer having these issues since cyst was removed.  Patient states she would like to recheck her thyroid in 6 to 8 weeks since the removal of the cyst to see if this is true and if it affected it in any way.  Not interested in starting another medication as she is tolerating the 44 mcg of euthyroid at this time.      Requests referral to podiatrist r/t to thickened toenails.     TSH    TSH 5/19/22 7/8/22   TSH 8.370 (A) 7.320 (A)   (A) Abnormal value            Lab Results   Component Value Date    HGBA1C 6.20 (H) 05/19/2022    HGBA1C 6.3 (H) 02/08/2014       Needing refills of HCTZ for HTN and metformin for prediabetes. Taking medications as prescribed without side effects.   BP and A1C is controlled. Denies headache, vision changes, polyuria, polydipsia, polyphagia, dizziness, chest pain, SOA.     The following portions of the patient's history were reviewed and updated as appropriate: allergies, current medications, past family history, past medical history, past social history, past surgical history and problem  "list.    Review of Systems   All other systems reviewed and are negative.      Objective   /78   Pulse 61   Temp 97.5 °F (36.4 °C) (Temporal)   Ht 175.3 cm (69\")   Wt 95.2 kg (209 lb 12.8 oz)   SpO2 99%   BMI 30.98 kg/m²   Body mass index is 30.98 kg/m².  Physical Exam  Vitals and nursing note reviewed.   Constitutional:       Appearance: Normal appearance.   HENT:      Head: Normocephalic and atraumatic.        Comments: Incision is well approximated, intact with stitches, no erythema, warmth, or drainage noted, nontender   Eyes:      Extraocular Movements: Extraocular movements intact.   Cardiovascular:      Rate and Rhythm: Normal rate and regular rhythm.   Pulmonary:      Effort: Pulmonary effort is normal.      Breath sounds: Normal breath sounds.   Musculoskeletal:         General: Normal range of motion.      Cervical back: Normal range of motion.   Feet:      Right foot:      Toenail Condition: Right toenails are abnormally thick.      Left foot:      Toenail Condition: Left toenails are abnormally thick.   Skin:     General: Skin is dry.   Neurological:      General: No focal deficit present.      Mental Status: She is alert and oriented to person, place, and time.   Psychiatric:         Mood and Affect: Mood normal.         Behavior: Behavior normal.         Thought Content: Thought content normal.         Judgment: Judgment normal.         Assessment & Plan   Nicci Mojica is here today and the following problems have been addressed:      Diagnoses and all orders for this visit:    1. Hypothyroidism (Primary)  -     TSH; Future  -     T4, free; Future  - Return for repeat lab work in 8 weeks.  Continue euthyroid 44 mcg daily 2 hours apart from other medications and food.    2. Essential hypertension  -     hydroCHLOROthiazide (HYDRODIURIL) 12.5 MG tablet; Take 1 tablet by mouth Daily.  Dispense: 90 tablet; Refill: 1  - Stable. Continue current medications as prescribed. Recommend DASH diet, " moderate-intensity exercises 4-5 times/week, medication compliance, and home blood pressure monitoring.     3. Prediabetes  -     metFORMIN (GLUMETZA) 500 MG (MOD) 24 hr tablet; Take 1 tablet by mouth Daily With Breakfast.  Dispense: 90 tablet; Refill: 1  - Last A1c 6.2%.  Continue low-carb high-protein diet and exercise regimen.  Continue metformin daily with breakfast.    4. Mixed hyperlipidemia  -     atorvastatin (LIPITOR) 20 MG tablet; Take 1 tablet by mouth Daily.  Dispense: 90 tablet; Refill: 1  - Lipid panel up-to-date.  Continue Lipitor nightly and low-cholesterol diet and exercise regimen.    5. Thickened nails  -     Ambulatory Referral to Podiatry     Follow Up   Return for 8 weeks labs.  Patient was given instructions and counseling regarding her condition or for health maintenance advice. Please see specific information pulled into the AVS if appropriate.     Magdalene GAMINO  [facility] [place]

## 2022-09-02 NOTE — TELEPHONE ENCOUNTER
metFORMIN (GLUMETZA) 500 MG (MOD) 24 hr tablet.  LAST PRESCRIPTION WAS REGULAR METFORMIN, IS THIS CORRECT TO CHANGE TO MODIFIED?    PLEASE CALL PHARMACY  [phone]

## 2022-09-03 LAB
CHOLEST SERPL-MCNC: 156 MG/DL (ref 0–200)
HCV AB S/CO SERPL IA: <0.1 S/CO RATIO (ref 0–0.9)
HDLC SERPL-MCNC: 65 MG/DL (ref 40–60)
LDLC SERPL CALC-MCNC: 76 MG/DL (ref 0–100)
T4 FREE SERPL-MCNC: 0.9 NG/DL (ref 0.93–1.7)
TRIGL SERPL-MCNC: 78 MG/DL (ref 0–150)
TSH SERPL DL<=0.005 MIU/L-ACNC: 7.5 UIU/ML (ref 0.27–4.2)
VLDLC SERPL CALC-MCNC: 15 MG/DL (ref 5–40)

## 2022-09-06 ENCOUNTER — HOSPITAL ENCOUNTER (OUTPATIENT)
Dept: SLEEP MEDICINE | Facility: HOSPITAL | Age: 72
Discharge: HOME | End: 2022-09-06
Admitting: NURSE PRACTITIONER

## 2022-09-06 DIAGNOSIS — G40.909 SEIZURE DISORDER: ICD-10-CM

## 2022-09-06 DIAGNOSIS — E03.9 HYPOTHYROIDISM, UNSPECIFIED TYPE: Primary | ICD-10-CM

## 2022-09-06 PROCEDURE — 95816 EEG AWAKE AND DROWSY: CPT

## 2022-09-06 RX ORDER — LEVOTHYROXINE SODIUM 50 UG/1
50 CAPSULE ORAL DAILY
Qty: 90 CAPSULE | Refills: 0 | Status: SHIPPED | OUTPATIENT
Start: 2022-09-06 | End: 2022-09-16

## 2022-09-09 ENCOUNTER — HOSPITAL ENCOUNTER (OUTPATIENT)
Dept: MAMMOGRAPHY | Facility: HOSPITAL | Age: 72
Discharge: HOME | End: 2022-09-09
Admitting: NURSE PRACTITIONER

## 2022-09-09 DIAGNOSIS — Z12.31 VISIT FOR SCREENING MAMMOGRAM: ICD-10-CM

## 2022-09-09 PROCEDURE — 77067 SCR MAMMO BI INCL CAD: CPT

## 2022-09-09 PROCEDURE — 77067 SCR MAMMO BI INCL CAD: CPT | Performed by: RADIOLOGY

## 2022-09-09 PROCEDURE — 77063 BREAST TOMOSYNTHESIS BI: CPT

## 2022-09-09 PROCEDURE — 77063 BREAST TOMOSYNTHESIS BI: CPT | Performed by: RADIOLOGY

## 2022-09-16 ENCOUNTER — OFFICE VISIT (OUTPATIENT)
Dept: INTERNAL MEDICINE | Facility: CLINIC | Age: 72
End: 2022-09-16

## 2022-09-16 VITALS
SYSTOLIC BLOOD PRESSURE: 140 MMHG | BODY MASS INDEX: 30.66 KG/M2 | DIASTOLIC BLOOD PRESSURE: 84 MMHG | TEMPERATURE: 97.9 F | OXYGEN SATURATION: 99 % | HEART RATE: 59 BPM | WEIGHT: 207 LBS | HEIGHT: 69 IN

## 2022-09-16 DIAGNOSIS — E03.9 HYPOTHYROIDISM, UNSPECIFIED TYPE: Primary | ICD-10-CM

## 2022-09-16 PROCEDURE — 99214 OFFICE O/P EST MOD 30 MIN: CPT | Performed by: NURSE PRACTITIONER

## 2022-09-16 RX ORDER — CEPHALEXIN 500 MG/1
CAPSULE ORAL
COMMUNITY
Start: 2022-09-13

## 2022-09-16 RX ORDER — METFORMIN HYDROCHLORIDE 500 MG/1
500 TABLET, EXTENDED RELEASE ORAL
COMMUNITY
Start: 2022-09-02 | End: 2023-01-31

## 2022-09-16 NOTE — PROGRESS NOTES
"Chief Complaint   Patient presents with   • Follow-up     Discuss labs, pathology report     Subjective   Nicci Mojica is a 71 y.o. female.     History of Present Illness     Patient presents for follow-up of lab results.  Instructed patient to have her labs drawn 8 weeks from prior appointment, patient had labs done earlier than expected.  TSH is about the same as last check.  Tried to send a different brand of thyroid medication called Tirosint due to patient having side effects from the Euthyrox at the 88 mcg but patient states that medication was too expensive so she did not pick it up.  She continues to take 44 mcg of Euthyrox.  She takes the medication in the morning with her metformin, so she does not avoid taking it along with other medications.  She states she does not eat for 30 minutes to an hour after taking the medication.  Patient is convinced that the side effects she was having, neck pain, headache, trouble swallowing, was from the cyst she had in her neck and not from the Euthyrox medication that she originally thought.  Has not received the pathology report from her cyst removal as of yet.    The following portions of the patient's history were reviewed and updated as appropriate: allergies, current medications, past family history, past medical history, past social history, past surgical history and problem list.    Review of Systems   All other systems reviewed and are negative.      Objective   /84   Pulse 59   Temp 97.9 °F (36.6 °C) (Temporal)   Ht 175.3 cm (69\")   Wt 93.9 kg (207 lb)   SpO2 99%   BMI 30.57 kg/m²   Body mass index is 30.57 kg/m².  Physical Exam  Constitutional:       Appearance: Normal appearance. She is obese.   HENT:      Head: Normocephalic and atraumatic.   Eyes:      Extraocular Movements: Extraocular movements intact.      Conjunctiva/sclera: Conjunctivae normal.      Pupils: Pupils are equal, round, and reactive to light.   Neck:      Thyroid: No thyroid " mass, thyromegaly or thyroid tenderness.   Cardiovascular:      Rate and Rhythm: Normal rate and regular rhythm.   Pulmonary:      Breath sounds: Normal breath sounds.   Abdominal:      General: Abdomen is flat. Bowel sounds are normal.      Palpations: Abdomen is soft.   Musculoskeletal:         General: Normal range of motion.      Cervical back: Normal range of motion.   Lymphadenopathy:      Cervical: No cervical adenopathy.   Skin:     General: Skin is warm and dry.   Neurological:      Mental Status: She is alert and oriented to person, place, and time.         Labs:  TSH    TSH 5/19/22 7/8/22 9/2/22   TSH 8.370 (A) 7.320 (A) 7.500 (A)   (A) Abnormal value                Assessment & Plan   Nicci Mojica is here today and the following problems have been addressed:      Diagnoses and all orders for this visit:    1. Hypothyroidism (Primary)  Take full dose of Euthyrox 88 mcg daily in the a.m. 2 hours apart from any other medications.  Can eat 30 minutes to 1 hour after the administration of the medication.  Follow-up next appointment for thyroid recheck.  Please notify me if you have any side effects from the medication and we can decrease it to 75 mcg.      Follow Up   Return for Next scheduled follow up.  Patient was given instructions and counseling regarding her condition or for health maintenance advice. Please see specific information pulled into the AVS if appropriate.     Magdalene GAMINO  [facility]

## 2022-09-23 ENCOUNTER — HOSPITAL ENCOUNTER (OUTPATIENT)
Dept: MRI IMAGING | Facility: HOSPITAL | Age: 72
Discharge: HOME | End: 2022-09-23
Admitting: NURSE PRACTITIONER

## 2022-09-23 DIAGNOSIS — Z87.820 HISTORY OF CLOSED HEAD INJURY: ICD-10-CM

## 2022-09-23 DIAGNOSIS — G40.909 SEIZURE DISORDER: ICD-10-CM

## 2022-09-23 PROCEDURE — 70553 MRI BRAIN STEM W/O & W/DYE: CPT

## 2022-09-23 PROCEDURE — 0 GADOBENATE DIMEGLUMINE 529 MG/ML SOLUTION: Performed by: NURSE PRACTITIONER

## 2022-09-23 PROCEDURE — A9577 INJ MULTIHANCE: HCPCS | Performed by: NURSE PRACTITIONER

## 2022-09-23 RX ADMIN — GADOBENATE DIMEGLUMINE 15 ML: 529 INJECTION, SOLUTION INTRAVENOUS at 09:34

## 2022-10-10 ENCOUNTER — OFFICE VISIT (OUTPATIENT)
Dept: NEUROLOGY | Facility: CLINIC | Age: 72
End: 2022-10-10

## 2022-10-10 VITALS
HEART RATE: 71 BPM | BODY MASS INDEX: 31.01 KG/M2 | WEIGHT: 209.4 LBS | DIASTOLIC BLOOD PRESSURE: 90 MMHG | SYSTOLIC BLOOD PRESSURE: 140 MMHG | HEIGHT: 69 IN | OXYGEN SATURATION: 100 % | TEMPERATURE: 97.3 F

## 2022-10-10 DIAGNOSIS — R51.9 NONINTRACTABLE EPISODIC HEADACHE, UNSPECIFIED HEADACHE TYPE: ICD-10-CM

## 2022-10-10 DIAGNOSIS — G40.909 SEIZURE DISORDER: Primary | ICD-10-CM

## 2022-10-10 DIAGNOSIS — G47.9 RESTLESS SLEEPER: ICD-10-CM

## 2022-10-10 DIAGNOSIS — Z87.820 HISTORY OF CLOSED HEAD INJURY: ICD-10-CM

## 2022-10-10 PROCEDURE — 99214 OFFICE O/P EST MOD 30 MIN: CPT | Performed by: NURSE PRACTITIONER

## 2022-10-10 RX ORDER — LEVETIRACETAM 100 MG/ML
SOLUTION ORAL
Qty: 900 ML | Refills: 1 | Status: SHIPPED | OUTPATIENT
Start: 2022-10-10

## 2022-10-10 RX ORDER — TERBINAFINE HYDROCHLORIDE 250 MG/1
250 TABLET ORAL
COMMUNITY
Start: 2022-09-26

## 2022-10-10 NOTE — PROGRESS NOTES
"     Follow Up Office Visit      Patient Name: Nicci Mojica  : 1950   MRN: [id number]     Chief Complaint:    Chief Complaint   Patient presents with   • Follow-up     Patient in office to follow up on seizures.    • Headache     Patient c/o frequently headaches.       History of Present Illness: Nicci Mojica is a 72 y.o. female who is here today to follow up with seizures and was last seen on 2022.  She is taking Keppra liquid 500mg BID- reports good compliance and toleration.  She has had no seizures since her previous visit.  She does not drive.  She is still waking up with headaches on many mornings.  She feels her headaches are due to her poor sleeping pattern.  She goes to bed around 2200 and wakes up around 0500.  She experiences restless sleep.  She awakens several times during the night and sometimes has difficulty falling back to sleep.  She denies snoring.  She has had about 10/30 headache days in the past month.  She describes her headaches as frontal or occipital.  She denies the headaches last more than 4 hours or are accompanied by nausea, vomiting, photophobia or phonophobia.    *EEG was normal on 2022.  MRI brain with and without contrast on 2022 showed Atrophy and chronic microvascular white matter changes.     Following taken from previous visit note:  Nicci Mojica is a 71 y.o. female who is here today to establish care with Neurology for seizure disorder.  She is retired and lives with her .  She is taking Keppra liquid 100mg/ml, 5 ml BID and reports good toleration and compliance- she had difficulty swallowing tablets.  Her most recent seizure was in .  She says she was going through \"a lot physically at the time\".  She does not drive.  She does have headaches about every other day- sometimes awakens with headaches.  Denies ever being diagnosed with JÚNIOR.  Says she sleeps well.  She graduated from high school and was in normal classes.  She has attended " "college.  She was the product of a full term birth.  She denies alcohol or illegal drug use.  Additional risk factors- BMI 31, hypothyroidism, dyslipidemia, diabetes.   *Previously seen by Dr. Otoniel Flowers, neurologist.       Pertinent Medical History:  Previous records from Dr. Otoniel Flowers reviewed at time of visit.  According to documentation, she has a long history of seizures after a head injury with loss of consciousness, after being struck in the head by a swing as a child.  She has \"staring\" seizures and has had convulsive nocturnal activity not associated with tongue bites, urinary incontinence or a post ictal period.  Most recent nocturnal seizure was in January 2020.  EEG on 11/6/2017 was normal.  Brain MRI on 1/16/2018 showed age-related changes.     Subjective      Review of Systems:   Review of Systems   Constitutional: Negative for chills, fatigue and fever.   HENT: Negative for facial swelling, hearing loss, sore throat, tinnitus and trouble swallowing.    Eyes: Negative for blurred vision, double vision, photophobia and visual disturbance.   Respiratory: Negative for cough, chest tightness and shortness of breath.    Cardiovascular: Negative for chest pain, palpitations and leg swelling.   Gastrointestinal: Negative for abdominal pain, nausea and vomiting.   Endocrine: Negative for cold intolerance and heat intolerance.   Musculoskeletal: Negative for gait problem, neck pain and neck stiffness.   Skin: Negative for color change and rash.   Allergic/Immunologic: Negative for environmental allergies and food allergies.   Neurological: Positive for seizures and headache. Negative for dizziness, syncope, speech difficulty, weakness, light-headedness, numbness and memory problem.   Psychiatric/Behavioral: Positive for sleep disturbance. Negative for behavioral problems and depressed mood. The patient is not nervous/anxious.        I have reviewed and the following portions of the patient's history were " "updated as appropriate: past family history, past medical history, past social history, past surgical history and problem list.    Medications:     Current Outpatient Medications:   •  atorvastatin (LIPITOR) 20 MG tablet, Take 1 tablet by mouth Daily., Disp: 90 tablet, Rfl: 1  •  cephalexin (KEFLEX) 500 MG capsule, , Disp: , Rfl:   •  hydroCHLOROthiazide (HYDRODIURIL) 12.5 MG tablet, Take 1 tablet by mouth Daily., Disp: 90 tablet, Rfl: 1  •  levETIRAcetam (Keppra) 100 MG/ML solution, 5ml PO BID, Disp: 900 mL, Rfl: 1  •  metFORMIN (GLUMETZA) 500 MG (MOD) 24 hr tablet, Take 1 tablet by mouth Daily With Breakfast., Disp: 90 tablet, Rfl: 1  •  metFORMIN ER (GLUCOPHAGE-XR) 500 MG 24 hr tablet, Take 500 mg by mouth Daily With Breakfast., Disp: , Rfl:   •  multivitamin with minerals tablet tablet, One A Day W/Iron, Disp: , Rfl:   •  mupirocin (BACTROBAN) 2 % ointment, APPLY TOPICALLY TO SURGICAL SITE ONCE DAILY, Disp: , Rfl:   •  terbinafine (lamiSIL) 250 MG tablet, Take 1 tablet by mouth., Disp: , Rfl:     Allergies:   Allergies   Allergen Reactions   • Sulfa Antibiotics Hives and Swelling   • Contrast Dye Hives   • Influenza Vaccines Swelling   • Penicillins Hives and Rash       Objective     Physical Exam:  Vital Signs:   Vitals:    10/10/22 0923   BP: 140/90   BP Location: Right arm   Patient Position: Sitting   Cuff Size: Adult   Pulse: 71   Temp: 97.3 °F (36.3 °C)   SpO2: 100%   Weight: 95 kg (209 lb 6.4 oz)   Height: 175.3 cm (69\")   PainSc: 0-No pain     Body mass index is 30.92 kg/m².    Physical Exam  Vitals and nursing note reviewed.   Constitutional:       General: She is not in acute distress.     Appearance: She is well-developed. She is obese. She is not diaphoretic.   HENT:      Head: Normocephalic and atraumatic.   Eyes:      Extraocular Movements: Extraocular movements intact.      Conjunctiva/sclera: Conjunctivae normal.      Pupils: Pupils are equal, round, and reactive to light.   Pulmonary:      " Effort: Pulmonary effort is normal. No respiratory distress.   Musculoskeletal:         General: Normal range of motion.   Skin:     General: Skin is dry.      Findings: No rash.   Neurological:      Mental Status: She is alert and oriented to person, place, and time.   Psychiatric:         Mood and Affect: Mood normal.         Behavior: Behavior normal.         Thought Content: Thought content normal.         Judgment: Judgment normal.         Neurologic Exam     Mental Status   Oriented to person, place, and time.     Cranial Nerves     CN III, IV, VI   Pupils are equal, round, and reactive to light.       Assessment / Plan      Assessment/Plan:   Diagnoses and all orders for this visit:    1. Seizure disorder (HCC) (Primary)    2. Nonintractable episodic headache, unspecified headache type    3. History of closed head injury    4. Restless sleeper    5. BMI 30.0-30.9,adult    Other orders  -     levETIRAcetam (Keppra) 100 MG/ML solution; 5ml PO BID  Dispense: 900 mL; Refill: 1    *Patient education on JÚNIOR provided today.   *Seizure precautions discussed and encouraged. I have encouraged her to make sure her family and friends know to call 911 for any seizure lasting more than 5 minutes.   *She does not drive.     Follow Up:   Return in about 6 months (around 4/10/2023) for Follow Up.    HANNY Garza, FNP-C  [facility]       Please note that portions of this note may have been completed with a voice recognition program. Efforts were made to edit the dictations, but occasionally words are mistranscribed.

## 2022-10-28 ENCOUNTER — TELEPHONE (OUTPATIENT)
Dept: INTERNAL MEDICINE | Facility: CLINIC | Age: 72
End: 2022-10-28

## 2022-10-28 NOTE — TELEPHONE ENCOUNTER
Unable to reach patient by phone to reschedule today's appointment in Magdalene's absence.  Left VM requesting return phone call.      HUB MAY ADVISE AND SCHEDULE.

## 2022-10-31 ENCOUNTER — OFFICE VISIT (OUTPATIENT)
Dept: INTERNAL MEDICINE | Facility: CLINIC | Age: 72
End: 2022-10-31

## 2022-10-31 VITALS
BODY MASS INDEX: 31.19 KG/M2 | WEIGHT: 210.6 LBS | TEMPERATURE: 97.3 F | HEIGHT: 69 IN | OXYGEN SATURATION: 97 % | SYSTOLIC BLOOD PRESSURE: 116 MMHG | HEART RATE: 88 BPM | DIASTOLIC BLOOD PRESSURE: 70 MMHG

## 2022-10-31 DIAGNOSIS — E03.9 HYPOTHYROIDISM, UNSPECIFIED TYPE: Primary | ICD-10-CM

## 2022-10-31 DIAGNOSIS — Z23 NEED FOR COVID-19 VACCINE: ICD-10-CM

## 2022-10-31 DIAGNOSIS — I10 ESSENTIAL HYPERTENSION: ICD-10-CM

## 2022-10-31 DIAGNOSIS — H25.9 AGE-RELATED CATARACT OF BOTH EYES, UNSPECIFIED AGE-RELATED CATARACT TYPE: ICD-10-CM

## 2022-10-31 PROCEDURE — 91312 COVID-19 (PFIZER) BIVALENT BOOSTER 12+YRS: CPT | Performed by: NURSE PRACTITIONER

## 2022-10-31 PROCEDURE — 99214 OFFICE O/P EST MOD 30 MIN: CPT | Performed by: NURSE PRACTITIONER

## 2022-10-31 PROCEDURE — 0124A COVID-19 (PFIZER) BIVALENT BOOSTER 12+YRS: CPT | Performed by: NURSE PRACTITIONER

## 2022-10-31 RX ORDER — LEVOTHYROXINE SODIUM 88 UG/1
88 TABLET ORAL DAILY
COMMUNITY
End: 2023-01-31

## 2022-10-31 RX ORDER — TRAMADOL HYDROCHLORIDE 50 MG/1
TABLET ORAL
COMMUNITY
Start: 2022-10-24

## 2022-10-31 RX ORDER — HYDROCHLOROTHIAZIDE 12.5 MG/1
12.5 TABLET ORAL DAILY
Qty: 90 TABLET | Refills: 1 | Status: SHIPPED | OUTPATIENT
Start: 2022-10-31 | End: 2023-01-31 | Stop reason: SDUPTHER

## 2022-10-31 NOTE — PROGRESS NOTES
"Chief Complaint   Patient presents with   • Hypothyroidism     Follow up; requesting referral to opthmologist     Subjective   Nicci Mojica is a 72 y.o. female.     History of Present Illness     Patient presents to follow up thyroid levels.  She is taking Euthyrox 88 mcg daily 2 hours apart from other medications or food. Had TSH and T4 drawn on 10/28/2022, reviewed with patient in office, within normal levels. Denies side effects from euthyrox.  Hypertension-patient is needing a refill of her hydrochlorothiazide 12.5 mg.  BP is stable.  Denies any dizziness, lightheadedness, headache, chest pain, shortness of air.  Brought in blood pressure and vital sign log and average is normal.  Patient is seeing Dr. Bush, podiatry and they are working on her hammertoes and fungal infection of the toenails.  Has another procedure scheduled on 11/7/2022.  Needing referral to ophthalmology, got diagnosed with cataracts last April and has not had a follow-up.  Vision is worsening.    The following portions of the patient's history were reviewed and updated as appropriate: allergies, current medications, past family history, past medical history, past social history, past surgical history and problem list.    Review of Systems   Eyes: Positive for visual disturbance.   Musculoskeletal: Positive for arthralgias (foot) and gait problem.   All other systems reviewed and are negative.      Objective   /70   Pulse 88   Temp 97.3 °F (36.3 °C) (Temporal)   Ht 175.3 cm (69\")   Wt 95.5 kg (210 lb 9.6 oz)   SpO2 97%   BMI 31.10 kg/m²   Body mass index is 31.1 kg/m².  Physical Exam  Vitals and nursing note reviewed.   Constitutional:       General: She is not in acute distress.     Appearance: Normal appearance. She is not diaphoretic.   Eyes:      Extraocular Movements: Extraocular movements intact.   Cardiovascular:      Rate and Rhythm: Normal rate and regular rhythm.   Pulmonary:      Effort: Pulmonary effort is normal. "      Breath sounds: Normal breath sounds.   Musculoskeletal:         General: Normal range of motion.      Cervical back: Normal range of motion.   Skin:     General: Skin is warm and dry.   Neurological:      Mental Status: She is alert and oriented to person, place, and time.   Psychiatric:         Mood and Affect: Mood normal.         Behavior: Behavior normal.         Labs:  Results for orders placed or performed in visit on 09/07/22   TSH    Specimen: Blood   Result Value Ref Range    TSH 1.870 0.270 - 4.200 uIU/mL   T4, free    Specimen: Blood   Result Value Ref Range    Free T4 1.42 0.93 - 1.70 ng/dL       Assessment & Plan   Nicci Mojica is here today and the following problems have been addressed:      Diagnoses and all orders for this visit:    1. Hypothyroidism (Primary)    2. Age-related cataract of both eyes, unspecified age-related cataract type  -     Ambulatory Referral to Ophthalmology    3. Essential hypertension  -     hydroCHLOROthiazide (HYDRODIURIL) 12.5 MG tablet; Take 1 tablet by mouth Daily.  Dispense: 90 tablet; Refill: 1    4. Need for COVID-19 vaccine  -     COVID-19 Bivalent Booster (Pfizer) 12+yrs    TSH/T4 within normal range.  Continue Euthyrox 88 mcg daily 2 hours apart from food or other meds.  Referral to ophthalmology to discuss cataracts.  BP Stable. Continue current medications as prescribed. Recommend DASH diet, moderate-intensity exercises 4-5 times/week, medication compliance, and home blood pressure monitoring.   COVID-19 booster administered today, tolerated well.  VIS given.      Follow Up   Return in about 6 months (around 4/30/2023) for Med Management.  Patient was given instructions and counseling regarding her condition or for health maintenance advice. Please see specific information pulled into the AVS if appropriate.     Magdalene GAMINO  [facility]

## 2023-01-31 DIAGNOSIS — I10 ESSENTIAL HYPERTENSION: ICD-10-CM

## 2023-01-31 DIAGNOSIS — E78.2 MIXED HYPERLIPIDEMIA: ICD-10-CM

## 2023-01-31 RX ORDER — TERBINAFINE HYDROCHLORIDE 250 MG/1
250 TABLET ORAL
OUTPATIENT
Start: 2023-01-31

## 2023-01-31 RX ORDER — METFORMIN HYDROCHLORIDE 500 MG/1
TABLET, EXTENDED RELEASE ORAL
Qty: 90 TABLET | Refills: 1 | Status: SHIPPED | OUTPATIENT
Start: 2023-01-31

## 2023-01-31 RX ORDER — ATORVASTATIN CALCIUM 20 MG/1
TABLET, FILM COATED ORAL
Qty: 90 TABLET | Refills: 1 | Status: SHIPPED | OUTPATIENT
Start: 2023-01-31

## 2023-01-31 RX ORDER — HYDROCHLOROTHIAZIDE 12.5 MG/1
12.5 TABLET ORAL DAILY
Qty: 90 TABLET | Refills: 1 | Status: SHIPPED | OUTPATIENT
Start: 2023-01-31

## 2023-01-31 RX ORDER — LEVOTHYROXINE SODIUM 88 UG/1
TABLET ORAL
Qty: 90 TABLET | Refills: 1 | Status: SHIPPED | OUTPATIENT
Start: 2023-01-31

## 2023-01-31 NOTE — TELEPHONE ENCOUNTER
Rx Refill Note  Requested Prescriptions     Pending Prescriptions Disp Refills   • Euthyrox 88 MCG tablet [Pharmacy Med Name: EUTHYROX 88 MCG Tablet] 90 tablet      Sig: TAKE 1 TABLET EVERY DAY   • metFORMIN ER (GLUCOPHAGE-XR) 500 MG 24 hr tablet [Pharmacy Med Name: METFORMIN HYDROCHLORIDE  MG Tablet Extended Release 24 Hour] 90 tablet      Sig: TAKE 1 TABLET EVERY DAY  WITH  BREAKFAST   • atorvastatin (LIPITOR) 20 MG tablet [Pharmacy Med Name: ATORVASTATIN CALCIUM 20 MG Tablet] 90 tablet 1     Sig: TAKE 1 TABLET EVERY DAY   • terbinafine (lamiSIL) 250 MG tablet       Sig: Take 1 tablet by mouth.   • hydroCHLOROthiazide (HYDRODIURIL) 12.5 MG tablet 90 tablet 1     Sig: Take 1 tablet by mouth Daily.      Last office visit with prescribing clinician: 10/31/2022   Last telemedicine visit with prescribing clinician: Visit date not found   Next office visit with prescribing clinician: Visit date not found                         Would you like a call back once the refill request has been completed: [] Yes [] No    If the office needs to give you a call back, can they leave a voicemail: [] Yes [] No    Freida Mattson LPN  01/31/23, 13:49 EST      THESE MEDICATIONS WERE ORIGINALLY ORDERED BY HISTORICAL PROVIDER.  ARE REFILLS APPROPRIATE?  PLEASE ADVISE.

## 2023-02-03 RX ORDER — TERBINAFINE HYDROCHLORIDE 250 MG/1
250 TABLET ORAL DAILY
OUTPATIENT
Start: 2023-02-03

## 2023-02-03 NOTE — TELEPHONE ENCOUNTER
Rx Refill Note  Requested Prescriptions     Pending Prescriptions Disp Refills   • terbinafine (lamiSIL) 250 MG tablet       Sig: Take 1 tablet by mouth Daily.      Last office visit with prescribing clinician: 10/31/2022   Last telemedicine visit with prescribing clinician: Visit date not found   Next office visit with prescribing clinician: Visit date not found                         Would you like a call back once the refill request has been completed: [] Yes [] No    If the office needs to give you a call back, can they leave a voicemail: [] Yes [] No    Freida Mattson LPN  02/03/23, 10:32 EST      IS THIS APPROPRIATE?  PLEASE ADVISE.

## 2023-04-10 ENCOUNTER — OFFICE VISIT (OUTPATIENT)
Dept: NEUROLOGY | Facility: CLINIC | Age: 73
End: 2023-04-10
Payer: MEDICARE

## 2023-04-10 VITALS
WEIGHT: 208 LBS | SYSTOLIC BLOOD PRESSURE: 122 MMHG | DIASTOLIC BLOOD PRESSURE: 80 MMHG | HEIGHT: 69 IN | HEART RATE: 71 BPM | BODY MASS INDEX: 30.81 KG/M2 | TEMPERATURE: 96.9 F | OXYGEN SATURATION: 97 %

## 2023-04-10 DIAGNOSIS — G40.909 SEIZURE DISORDER: Primary | ICD-10-CM

## 2023-04-10 PROCEDURE — 99214 OFFICE O/P EST MOD 30 MIN: CPT | Performed by: NURSE PRACTITIONER

## 2023-04-10 PROCEDURE — 1160F RVW MEDS BY RX/DR IN RCRD: CPT | Performed by: NURSE PRACTITIONER

## 2023-04-10 PROCEDURE — 3079F DIAST BP 80-89 MM HG: CPT | Performed by: NURSE PRACTITIONER

## 2023-04-10 PROCEDURE — 3074F SYST BP LT 130 MM HG: CPT | Performed by: NURSE PRACTITIONER

## 2023-04-10 PROCEDURE — 1159F MED LIST DOCD IN RCRD: CPT | Performed by: NURSE PRACTITIONER

## 2023-04-10 RX ORDER — LEVETIRACETAM 100 MG/ML
SOLUTION ORAL
Qty: 900 ML | Refills: 3 | Status: SHIPPED | OUTPATIENT
Start: 2023-04-10

## 2023-04-10 NOTE — PROGRESS NOTES
"     Follow Up Office Visit      Patient Name: Nicci Mojica  : 1950   MRN: [id number]     Chief Complaint:    Chief Complaint   Patient presents with   • Follow-up     Patient in office to follow up on seizures.        History of Present Illness: Nicci Mojica is a 72 y.o. female who is here today to follow up with seizures and was last seen on 10/10/2022.  She is taking Keppra 500mg BID- reports good compliance and toleration.  She has had no seizures since her previous visit.  She feels she is doing well.  She did not have the sleep study done, ordered at her previous visit and states, \"I think that was all me\".  She mentions she saw her PCP regarding some knots to the right side of her neck (as I suggested at previous visit) and she ended up having several lipomas removed, from the area.  Since that surgery her headaches are much better and she is able to move her neck fully.      Following taken from previous visit note:  Nicci Mojica is a 72 y.o. female who is here today to follow up with seizures and was last seen on 2022.  She is taking Keppra liquid 500mg BID- reports good compliance and toleration.  She has had no seizures since her previous visit.  She does not drive.  She is still waking up with headaches on many mornings.  She feels her headaches are due to her poor sleeping pattern.  She goes to bed around 2200 and wakes up around 0500.  She experiences restless sleep.  She awakens several times during the night and sometimes has difficulty falling back to sleep.  She denies snoring.  She has had about 10/30 headache days in the past month.  She describes her headaches as frontal or occipital.  She denies the headaches last more than 4 hours or are accompanied by nausea, vomiting, photophobia or phonophobia.    *EEG was normal on 2022.  MRI brain with and without contrast on 2022 showed Atrophy and chronic microvascular white matter changes.     Subjective      Review of " Systems:   Review of Systems   Constitutional: Negative for chills, fatigue and fever.   HENT: Negative for facial swelling, hearing loss, sore throat, tinnitus and trouble swallowing.    Eyes: Negative for blurred vision, double vision, photophobia and visual disturbance.   Respiratory: Negative for cough, chest tightness and shortness of breath.    Cardiovascular: Negative for chest pain, palpitations and leg swelling.   Gastrointestinal: Negative for abdominal pain, nausea and vomiting.   Endocrine: Negative for cold intolerance and heat intolerance.   Musculoskeletal: Negative for gait problem, neck pain and neck stiffness.   Skin: Negative for color change and rash.   Allergic/Immunologic: Negative for environmental allergies and food allergies.   Neurological: Positive for seizures. Negative for dizziness, syncope, speech difficulty, weakness, light-headedness, numbness, headache and memory problem.   Psychiatric/Behavioral: Negative for behavioral problems, sleep disturbance and depressed mood. The patient is not nervous/anxious.        I have reviewed and the following portions of the patient's history were updated as appropriate: past family history, past medical history, past social history, past surgical history and problem list.    Medications:     Current Outpatient Medications:   •  atorvastatin (LIPITOR) 20 MG tablet, TAKE 1 TABLET EVERY DAY, Disp: 90 tablet, Rfl: 1  •  cephalexin (KEFLEX) 500 MG capsule, , Disp: , Rfl:   •  Euthyrox 88 MCG tablet, TAKE 1 TABLET EVERY DAY, Disp: 90 tablet, Rfl: 1  •  hydroCHLOROthiazide (HYDRODIURIL) 12.5 MG tablet, Take 1 tablet by mouth Daily., Disp: 90 tablet, Rfl: 1  •  levETIRAcetam (Keppra) 100 MG/ML solution, 5ml PO BID, Disp: 900 mL, Rfl: 3  •  metFORMIN ER (GLUCOPHAGE-XR) 500 MG 24 hr tablet, TAKE 1 TABLET EVERY DAY  WITH  BREAKFAST, Disp: 90 tablet, Rfl: 1  •  multivitamin with minerals tablet tablet, One A Day W/Iron, Disp: , Rfl:   •  mupirocin (BACTROBAN)  "2 % ointment, APPLY TOPICALLY TO SURGICAL SITE ONCE DAILY, Disp: , Rfl:   •  terbinafine (lamiSIL) 250 MG tablet, Take 1 tablet by mouth., Disp: , Rfl:   •  traMADol (ULTRAM) 50 MG tablet, , Disp: , Rfl:     Allergies:   Allergies   Allergen Reactions   • Sulfa Antibiotics Hives and Swelling   • Contrast Dye (Echo Or Unknown Ct/Mr) Hives   • Influenza Vaccines Swelling   • Penicillins Hives and Rash       Objective     Physical Exam:  Vital Signs:   Vitals:    04/10/23 0854   BP: 122/80   BP Location: Right arm   Patient Position: Sitting   Cuff Size: Adult   Pulse: 71   Temp: 96.9 °F (36.1 °C)   SpO2: 97%   Weight: 94.3 kg (208 lb)   Height: 175.3 cm (69\")   PainSc: 0-No pain     Body mass index is 30.72 kg/m².    Physical Exam  Vitals and nursing note reviewed.   Constitutional:       General: She is not in acute distress.     Appearance: She is well-developed. She is obese. She is not diaphoretic.   HENT:      Head: Normocephalic and atraumatic.   Eyes:      Extraocular Movements: Extraocular movements intact.      Conjunctiva/sclera: Conjunctivae normal.      Pupils: Pupils are equal, round, and reactive to light.   Pulmonary:      Effort: Pulmonary effort is normal. No respiratory distress.   Musculoskeletal:         General: Normal range of motion.   Skin:     General: Skin is warm and dry.      Findings: No rash.   Neurological:      Mental Status: She is alert and oriented to person, place, and time.   Psychiatric:         Mood and Affect: Mood normal.         Behavior: Behavior normal.         Thought Content: Thought content normal.         Judgment: Judgment normal.         Neurologic Exam     Mental Status   Oriented to person, place, and time.     Cranial Nerves     CN III, IV, VI   Pupils are equal, round, and reactive to light.       Assessment / Plan      Assessment/Plan:   Diagnoses and all orders for this visit:    1. Seizure disorder (Primary)  -     levETIRAcetam (Keppra) 100 MG/ML solution; 5ml " PO BID  Dispense: 900 mL; Refill: 3    2. BMI 30.0-30.9,adult    *Seizure precautions discussed and encouraged. She does not drive. I have advised patient to let her family and friends know to call 911 for any seizure activity lasting more than 5 minutes.      Follow Up:   Return in about 1 year (around 4/10/2024) for Follow Up.    HANNY Garza, FNP-C  [facility]       Please note that portions of this note may have been completed with a voice recognition program. Efforts were made to edit the dictations, but occasionally words are mistranscribed.

## 2023-04-19 DIAGNOSIS — I10 ESSENTIAL HYPERTENSION: ICD-10-CM

## 2023-04-19 RX ORDER — HYDROCHLOROTHIAZIDE 12.5 MG/1
TABLET ORAL
Qty: 90 TABLET | Refills: 1 | Status: SHIPPED | OUTPATIENT
Start: 2023-04-19

## 2023-04-19 RX ORDER — LEVOTHYROXINE SODIUM 88 UG/1
88 TABLET ORAL DAILY
Qty: 90 TABLET | Refills: 1 | Status: SHIPPED | OUTPATIENT
Start: 2023-04-19

## 2023-07-17 NOTE — TELEPHONE ENCOUNTER
Called pt and relayed information.   You can access the FollowMyHealth Patient Portal offered by [facility] by registering at the following website: [email or web address]. By joining [facility]’s FollowMyHealth portal, you will also be able to view your health information using other applications (apps) compatible with our system.

## 2023-07-31 ENCOUNTER — TRANSCRIBE ORDERS (OUTPATIENT)
Dept: ADMINISTRATIVE | Facility: HOSPITAL | Age: 73
End: 2023-07-31
Payer: MEDICARE

## 2023-07-31 DIAGNOSIS — Z12.31 VISIT FOR SCREENING MAMMOGRAM: Primary | ICD-10-CM

## 2023-08-28 DIAGNOSIS — E78.2 MIXED HYPERLIPIDEMIA: ICD-10-CM

## 2023-08-28 RX ORDER — METFORMIN HYDROCHLORIDE 500 MG/1
TABLET, EXTENDED RELEASE ORAL
Qty: 90 TABLET | Refills: 1 | OUTPATIENT
Start: 2023-08-28

## 2023-08-28 RX ORDER — ATORVASTATIN CALCIUM 20 MG/1
TABLET, FILM COATED ORAL
Qty: 90 TABLET | Refills: 1 | OUTPATIENT
Start: 2023-08-28

## 2023-08-28 NOTE — TELEPHONE ENCOUNTER
Attempted contacting Pt, no answer.     HUB TO SHARE: Pt must have an appointment for further refills. If she is out of medication and unable to get in soon, we can give her enough medication to get her by until her appointment.

## 2023-09-06 ENCOUNTER — TELEPHONE (OUTPATIENT)
Dept: INTERNAL MEDICINE | Facility: CLINIC | Age: 73
End: 2023-09-06

## 2023-09-06 NOTE — TELEPHONE ENCOUNTER
Pharmacy Name:  [facility] - [facility] [address]  - [phone]  - [phone] FX     Reference Number (if applicable):     Pharmacy representative name: MILE    Pharmacy representative phone number: [phone]     What medication are you calling in regards to: atorvastatin (LIPITOR) 20 MG tablet     What question does the pharmacy have: ORDERING REFILL    Who is the provider that prescribed the medication: MS. MINA HEARD    Additional notes: I READ HUB TO READ MESSAGE AS WRITTEN TO PHARMACY AS WRITTEN, MILE STATED UNDERSTANDING AND WOULD RELAY MESSAGE TO PATIENT TO CALL IN FOR AN APPOINTMENT.       Princess Rosales MA     OB    12:32 PM  Note      Attempted contacting Pt, no answer.      HUB TO SHARE: Pt must have an appointment for further refills. If she is out of medication and unable to get in soon, we can give her enough medication to get her by until her appointment.

## 2023-09-11 ENCOUNTER — HOSPITAL ENCOUNTER (OUTPATIENT)
Dept: MAMMOGRAPHY | Facility: HOSPITAL | Age: 73
Discharge: HOME | End: 2023-09-11
Admitting: NURSE PRACTITIONER
Payer: MEDICARE

## 2023-09-11 DIAGNOSIS — Z12.31 VISIT FOR SCREENING MAMMOGRAM: ICD-10-CM

## 2023-09-11 PROCEDURE — 77067 SCR MAMMO BI INCL CAD: CPT

## 2023-09-11 PROCEDURE — 77063 BREAST TOMOSYNTHESIS BI: CPT

## 2023-11-02 RX ORDER — LEVOTHYROXINE SODIUM 88 UG/1
88 TABLET ORAL DAILY
Qty: 15 TABLET | Refills: 0 | Status: SHIPPED | OUTPATIENT
Start: 2023-11-02

## 2023-12-28 DIAGNOSIS — G40.909 SEIZURE DISORDER: ICD-10-CM

## 2023-12-28 RX ORDER — LEVETIRACETAM 100 MG/ML
SOLUTION ORAL
Qty: 900 ML | Refills: 1 | Status: SHIPPED | OUTPATIENT
Start: 2023-12-28

## 2024-01-17 DIAGNOSIS — I10 ESSENTIAL HYPERTENSION: ICD-10-CM

## 2024-01-17 NOTE — TELEPHONE ENCOUNTER
Rx Refill Note  Requested Prescriptions     Pending Prescriptions Disp Refills    hydroCHLOROthiazide (HYDRODIURIL) 12.5 MG tablet [Pharmacy Med Name: HYDROCHLOROTHIAZIDE 12.5 MG Tablet] 90 tablet 3     Sig: TAKE 1 TABLET EVERY DAY      Last office visit with prescribing clinician: 10/31/2022   Last telemedicine visit with prescribing clinician: Visit date not found   Next office visit with prescribing clinician: Visit date not found                         Would you like a call back once the refill request has been completed: [] Yes [] No    If the office needs to give you a call back, can they leave a voicemail: [] Yes [] No    Nika Fair MA  01/17/24, 08:41 EST

## 2024-01-18 RX ORDER — HYDROCHLOROTHIAZIDE 12.5 MG/1
TABLET ORAL
Qty: 90 TABLET | Refills: 3 | OUTPATIENT
Start: 2024-01-18

## 2024-04-30 ENCOUNTER — OFFICE VISIT (OUTPATIENT)
Dept: INTERNAL MEDICINE | Facility: CLINIC | Age: 74
End: 2024-04-30
Payer: MEDICARE

## 2024-04-30 VITALS
BODY MASS INDEX: 31.1 KG/M2 | WEIGHT: 210 LBS | DIASTOLIC BLOOD PRESSURE: 72 MMHG | HEART RATE: 87 BPM | OXYGEN SATURATION: 97 % | SYSTOLIC BLOOD PRESSURE: 118 MMHG | HEIGHT: 69 IN | TEMPERATURE: 97.9 F

## 2024-04-30 DIAGNOSIS — I10 ESSENTIAL HYPERTENSION: Primary | ICD-10-CM

## 2024-04-30 DIAGNOSIS — R73.03 PREDIABETES: ICD-10-CM

## 2024-04-30 DIAGNOSIS — R56.9 SEIZURES: ICD-10-CM

## 2024-04-30 DIAGNOSIS — E78.2 MIXED HYPERLIPIDEMIA: ICD-10-CM

## 2024-04-30 DIAGNOSIS — E03.9 ACQUIRED HYPOTHYROIDISM: ICD-10-CM

## 2024-04-30 DIAGNOSIS — M79.675 TOE PAIN, LEFT: ICD-10-CM

## 2024-04-30 PROCEDURE — G2211 COMPLEX E/M VISIT ADD ON: HCPCS | Performed by: NURSE PRACTITIONER

## 2024-04-30 PROCEDURE — 99214 OFFICE O/P EST MOD 30 MIN: CPT | Performed by: NURSE PRACTITIONER

## 2024-04-30 RX ORDER — HYDROCHLOROTHIAZIDE 12.5 MG/1
12.5 TABLET ORAL DAILY
Qty: 30 TABLET | Refills: 0 | Status: SHIPPED | OUTPATIENT
Start: 2024-04-30

## 2024-04-30 RX ORDER — LEVOTHYROXINE SODIUM 88 MCG
88 TABLET ORAL DAILY
Qty: 30 TABLET | Refills: 1 | Status: SHIPPED | OUTPATIENT
Start: 2024-04-30

## 2024-04-30 NOTE — PROGRESS NOTES
"    Office Visit      Date: 2024   Patient Name: Nicci Mojica  : 1950   MRN: [id number]     Chief Complaint:    Chief Complaint   Patient presents with   • Follow-up     To re-establish care, was seeing someone at [facility] due to issue with [employer] and [employer].        History of Present Illness: Nicci Mojica is a 73 y.o. female presents to reestablish care.  Was seeing someone at [facility] due to insurance issues.   Had labs at [facility] 1 month ago, will request recent labs and progress notes  Has not had annual wellness this year  Hypertension; stable on hydrochlorothiazide.  No chest pain, shortness of breath, visual changes, headaches, edema  Hyperlipidemia; taking Lipitor 20 mg nightly  Prediabetes; previously has been stable on metformin 500 mg twice a day with meals  Hypothyroidism; patient would like to switch from Euthyrox to brand name of Synthroid.  Euthyrox causes her skin rashes and makes her feel overall \"bad\".  She has not taken it for the past 6 months due to the side effects.  Patient also takes a B12 and multivitamin.  Patient would like a referral to another podiatrist, was seeing Dr. Flash Bush.    Subjective      Review of Systems:   Pertinent ROS noted in HPI.     I have reviewed the patients family history, social history, past medical history, past surgical history and have updated it as appropriate.     Medications:     Current Outpatient Medications:   •  atorvastatin (LIPITOR) 20 MG tablet, TAKE 1 TABLET EVERY DAY, Disp: 90 tablet, Rfl: 1  •  Cyanocobalamin (VITAMIN B 12 PO), Take 5,000 mcg by mouth Daily., Disp: , Rfl:   •  hydroCHLOROthiazide 12.5 MG tablet, Take 1 tablet by mouth Daily., Disp: 30 tablet, Rfl: 0  •  levETIRAcetam (KEPPRA) 100 MG/ML solution, TAKE 5ML BY MOUTH TWICE DAILY AS DIRECTED, Disp: 900 mL, Rfl: 1  •  metFORMIN (GLUCOPHAGE) 500 MG tablet, Take 1 tablet by mouth 2 (Two) Times a Day With Meals., Disp: , Rfl:   •  multivitamin with minerals " "tablet tablet, One A Day W/Iron, Disp: , Rfl:   •  Synthroid 88 MCG tablet, Take 1 tablet by mouth Daily., Disp: 30 tablet, Rfl: 1    Allergies:   Allergies   Allergen Reactions   • Sulfa Antibiotics Hives and Swelling   • Contrast Dye (Echo Or Unknown Ct/Mr) Hives   • Iodinated Contrast Media Hives   • Influenza Vaccines Swelling   • Penicillins Hives and Rash       Objective     Physical Exam  Physical Exam  Vitals and nursing note reviewed.   Constitutional:       General: She is not in acute distress.     Appearance: Normal appearance. She is obese.   HENT:      Head: Normocephalic and atraumatic.      Right Ear: External ear normal.      Left Ear: External ear normal.      Nose: Nose normal.      Mouth/Throat:      Mouth: Mucous membranes are moist.   Eyes:      General: No scleral icterus.     Extraocular Movements: Extraocular movements intact.      Pupils: Pupils are equal, round, and reactive to light.   Cardiovascular:      Rate and Rhythm: Normal rate and regular rhythm.      Heart sounds: Normal heart sounds.   Pulmonary:      Effort: Pulmonary effort is normal. No respiratory distress.      Breath sounds: Normal breath sounds. No wheezing or rhonchi.   Abdominal:      General: Bowel sounds are normal.      Tenderness: There is no abdominal tenderness.   Musculoskeletal:         General: Normal range of motion.      Cervical back: Normal range of motion.      Right lower leg: No edema.      Left lower leg: No edema.   Skin:     General: Skin is warm and dry.      Findings: No rash.   Neurological:      General: No focal deficit present.      Mental Status: She is alert and oriented to person, place, and time. Mental status is at baseline.   Psychiatric:         Mood and Affect: Mood normal.         Behavior: Behavior normal.         Vital Signs:   Vitals:    04/30/24 0947   BP: 118/72   Pulse: 87   Temp: 97.9 °F (36.6 °C)   SpO2: 97%   Weight: 95.3 kg (210 lb)   Height: 175.3 cm (69\")   PainSc: 0-No pain "     Body mass index is 31.01 kg/m².  BMI is >= 30 and <35. (Class 1 Obesity). The following options were offered after discussion;: Information on healthy weight added to patient's after visit summary.       Assessment / Plan      Assessment/Plan:   Problem List Items Addressed This Visit          Cardiac and Vasculature    Mixed hyperlipidemia  Continue atorvastatin 20 mg nightly, low-cholesterol diet, exercise regimen    Essential hypertension - Primary    Relevant Medications    hydroCHLOROthiazide 12.5 MG tablet  Stable. Continue current medications as prescribed. Recommend DASH diet, 30 minutes of exercise 5 times/week, medication compliance, and home blood pressure monitoring. '       Endocrine and Metabolic    Hypothyroidism    Relevant Medications    Synthroid 88 MCG tablet  Request labs to obtain TSH from 1 month ago  Switch Euthyrox to Synthroid due to intolerance and side effects    Prediabetes  Previously stable on metformin.  Continue as directed, eliminate sweets, a high-protein balanced diet       Neuro    Seizures  Stable.  No recent seizures.  Follows neurology.  Takes Keppra 500 mg twice daily     Other Visit Diagnoses       Toe pain, left        Relevant Orders    Ambulatory Referral to Orthopedic Surgery (Completed)  Per request of patient will refer her to [facility], Dr. Garza              Follow Up:   Return in about 6 weeks (around 6/11/2024) for Medicare Wellness.        Magdalene GAMINO  [facility] [place]

## 2024-05-23 DIAGNOSIS — G40.909 SEIZURE DISORDER: ICD-10-CM

## 2024-05-23 RX ORDER — LEVETIRACETAM 100 MG/ML
SOLUTION ORAL
Qty: 300 ML | Refills: 2 | Status: SHIPPED | OUTPATIENT
Start: 2024-05-23

## 2024-06-13 ENCOUNTER — OFFICE VISIT (OUTPATIENT)
Dept: INTERNAL MEDICINE | Facility: CLINIC | Age: 74
End: 2024-06-13
Payer: MEDICARE

## 2024-06-13 VITALS
TEMPERATURE: 97.9 F | BODY MASS INDEX: 30.36 KG/M2 | SYSTOLIC BLOOD PRESSURE: 124 MMHG | WEIGHT: 205 LBS | DIASTOLIC BLOOD PRESSURE: 80 MMHG | HEART RATE: 58 BPM | HEIGHT: 69 IN | OXYGEN SATURATION: 98 %

## 2024-06-13 DIAGNOSIS — R56.9 SEIZURES: ICD-10-CM

## 2024-06-13 DIAGNOSIS — Z59.9 FINANCIAL DIFFICULTIES: ICD-10-CM

## 2024-06-13 DIAGNOSIS — E03.9 ACQUIRED HYPOTHYROIDISM: ICD-10-CM

## 2024-06-13 DIAGNOSIS — R09.A2 GLOBUS SENSATION: ICD-10-CM

## 2024-06-13 DIAGNOSIS — R73.03 PREDIABETES: ICD-10-CM

## 2024-06-13 DIAGNOSIS — Z00.00 ANNUAL PHYSICAL EXAM: ICD-10-CM

## 2024-06-13 DIAGNOSIS — E78.2 MIXED HYPERLIPIDEMIA: ICD-10-CM

## 2024-06-13 DIAGNOSIS — I10 ESSENTIAL HYPERTENSION: ICD-10-CM

## 2024-06-13 DIAGNOSIS — Z00.00 MEDICARE ANNUAL WELLNESS VISIT, SUBSEQUENT: Primary | ICD-10-CM

## 2024-06-13 RX ORDER — LEVOTHYROXINE SODIUM 88 MCG
88 TABLET ORAL DAILY
Qty: 90 TABLET | Refills: 1 | Status: SHIPPED | OUTPATIENT
Start: 2024-06-13

## 2024-06-13 RX ORDER — HYDROCHLOROTHIAZIDE 12.5 MG/1
12.5 TABLET ORAL DAILY
Qty: 90 TABLET | Refills: 1 | Status: SHIPPED | OUTPATIENT
Start: 2024-06-13

## 2024-06-13 SDOH — ECONOMIC STABILITY - INCOME SECURITY: PROBLEM RELATED TO HOUSING AND ECONOMIC CIRCUMSTANCES, UNSPECIFIED: Z59.9

## 2024-06-13 NOTE — PROGRESS NOTES
Subsequent Medicare Wellness Visit    Subjective    Nicci Mojica is a 73 y.o. female who presents for a Subsequent Medicare Wellness Visit.    HTN - blood pressure stable. Compliant with medications without side effects. Denies chest pain/tightness, visual changes, SOA, palpitations, lower extremity edema, headache.     HLD - compliant with statin.    Prediabetes; taking metformin 500 mg BID with meals.     Hypothyroidism; could not tolerate Euthyrox so switched to synthroid but medication is $45 which is too expensive for patient to pay monthly. Denies constipation, skin changes, hot/cold intolerance, lump or swelling in throat, hoarseness. She does notice a globus sensation at times.     Seizures - stable. Follows neurology and taking keppra daily     Of note she also takes B12 and MV  Mammogram is approaching as well as DEXA scan  Cologuard normal 2022  Overdue on eye exam. Has top/bottom dentures.       The following portions of the patient's history were reviewed and   updated as appropriate: allergies, current medications, past family history, past medical history, past social history, past surgical history, and problem list.    Compared to one year ago, the patient feels her physical   health is the same.    Compared to one year ago, the patient feels her mental   health is the same.    Recent Hospitalizations:  She was not admitted to the hospital during the last year.       Current Medical Providers:  Patient Care Team:  Magdalene Bush APRN as PCP - General (Family Medicine)  Pato Ramirez MD as Consulting Physician (General Surgery)    Outpatient Medications Prior to Visit   Medication Sig Dispense Refill    atorvastatin (LIPITOR) 20 MG tablet TAKE 1 TABLET EVERY DAY 90 tablet 1    Cyanocobalamin (VITAMIN B 12 PO) Take 5,000 mcg by mouth Daily.      levETIRAcetam (KEPPRA) 100 MG/ML solution TAKE 5ML BY MOUTH TWICE DAILY AS DIRECTED 300 mL 2    metFORMIN (GLUCOPHAGE) 500 MG tablet Take 1 tablet by  "mouth 2 (Two) Times a Day With Meals.      multivitamin with minerals tablet tablet One A Day W/Iron      hydroCHLOROthiazide 12.5 MG tablet Take 1 tablet by mouth Daily. 30 tablet 0    Synthroid 88 MCG tablet Take 1 tablet by mouth Daily. 30 tablet 1     No facility-administered medications prior to visit.       No opioid medication identified on active medication list. I have reviewed chart for other potential  high risk medication/s and harmful drug interactions in the elderly.        Aspirin is not on active medication list.  Aspirin use is not indicated based on review of current medical condition/s. Risk of harm outweighs potential benefits.  .    Patient Active Problem List   Diagnosis    Hypothyroidism    Prediabetes    Mixed hyperlipidemia    Essential hypertension    Seizures     Advance Care Planning   Advance Care Planning     Advance Directive is not on file.  ACP discussion was held with the patient during this visit. Patient does not have an advance directive, information provided.     Objective    Vitals:    24 0951   BP: 124/80   Pulse: 58   Temp: 97.9 °F (36.6 °C)   SpO2: 98%   Weight: 93 kg (205 lb)   Height: 175.3 cm (69\")   PainSc: 0-No pain     Estimated body mass index is 30.27 kg/m² as calculated from the following:    Height as of this encounter: 175.3 cm (69\").    Weight as of this encounter: 93 kg (205 lb).    Does the patient have evidence of cognitive impairment? No          HEALTH RISK ASSESSMENT    Smoking Status:  Social History     Tobacco Use   Smoking Status Never   Smokeless Tobacco Never     Alcohol Consumption:  Social History     Substance and Sexual Activity   Alcohol Use Defer     Fall Risk Screen:    STEADI Fall Risk Assessment was completed, and patient is at MODERATE risk for falls. Assessment completed on:2024    Depression Screenin/13/2024     9:55 AM   PHQ-2/PHQ-9 Depression Screening   Little Interest or Pleasure in Doing Things 0-->not at all "   Feeling Down, Depressed or Hopeless 2-->more than half the days   Trouble Falling or Staying Asleep, or Sleeping Too Much 0-->not at all   Feeling Tired or Having Little Energy 1-->several days   Poor Appetite or Overeating 2-->more than half the days   Feeling Bad about Yourself - or that You are a Failure or Have Let Yourself or Your Family Down 2-->more than half the days   Trouble Concentrating on Things, Such as Reading the Newspaper or Watching Television 0-->not at all   Moving or Speaking So Slowly that Other People Could Have Noticed? Or the Opposite - Being So Fidgety 0-->not at all   Thoughts that You Would be Better Off Dead or of Hurting Yourself in Some Way 0-->not at all   PHQ-9: Brief Depression Severity Measure Score 7   If You Checked Off Any Problems, How Difficult Have These Problems Made It For You to Do Your Work, Take Care of Things at Home, or Get Along with Other People? not difficult at all       Health Habits and Functional and Cognitive Screenin/13/2024     9:52 AM   Functional & Cognitive Status   Do you have difficulty preparing food and eating? No   Do you have difficulty bathing yourself, getting dressed or grooming yourself? No   Do you have difficulty using the toilet? No   Do you have difficulty moving around from place to place? No   Do you have trouble with steps or getting out of a bed or a chair? No   Current Diet Limited Junk Food   Dental Exam Not up to date   Eye Exam Up to date   Exercise (times per week) 5 times per week   Current Exercises Include House Cleaning   Do you need help using the phone?  No   Are you deaf or do you have serious difficulty hearing?  No   Do you need help to go to places out of walking distance? Yes   Do you need help shopping? No   Do you need help preparing meals?  No   Do you need help with housework?  No   Do you need help with laundry? No   Do you need help taking your medications? No   Do you need help managing money? Yes   Do  you ever drive or ride in a car without wearing a seat belt? No   Have you felt unusual stress, anger or loneliness in the last month? Yes   Who do you live with? Spouse   If you need help, do you have trouble finding someone available to you? No   Have you been bothered in the last four weeks by sexual problems? No   Do you have difficulty concentrating, remembering or making decisions? No       Age-appropriate Screening Schedule:  Refer to the list below for future screening recommendations based on patient's age, sex and/or medical conditions. Orders for these recommended tests are listed in the plan section. The patient has been provided with a written plan.    Health Maintenance   Topic Date Due    URINE MICROALBUMIN  Never done    DIABETIC FOOT EXAM  Never done    ANNUAL WELLNESS VISIT  07/08/2023    DIABETIC EYE EXAM  03/09/2024    DXA SCAN  08/03/2024    MAMMOGRAM  09/11/2024    ZOSTER VACCINE (1 of 2) 06/13/2024 (Originally 10/4/2001)    COVID-19 Vaccine (5 - 2023-24 season) 07/20/2024 (Originally 9/1/2023)    RSV Vaccine - Adults (1 - 1-dose 60+ series) 04/30/2025 (Originally 10/4/2011)    TDAP/TD VACCINES (1 - Tdap) 04/30/2025 (Originally 10/4/1970)    HEMOGLOBIN A1C  09/28/2024    LIPID PANEL  03/28/2025    BMI FOLLOWUP  04/30/2025    COLORECTAL CANCER SCREENING  07/13/2025    HEPATITIS C SCREENING  Completed    Pneumococcal Vaccine 65+  Completed          Risk Factors Identified During Encounter  Immunizations Discussed/Encouraged: Tdap, Shingrix, COVID19, and RSV (Respiratory Syncytial Virus)  Inadequate Social Support, Isolation, Loneliness, Lack of Transportation, Financial Difficulties, or Caregiver Stress: referral case management   Inactivity/Sedentary: Patient was advised to exercise at least 150 minutes a week per CDC recommendations.  Vision Screening Recommended  The above risks/problems have been discussed with the patient.  Pertinent information has been shared with the patient in the After  "Visit Summary.  An After Visit Summary and PPPS were made available to the patient.    Follow Up:   Next Medicare Wellness visit to be scheduled in 1 year.       Additional E&M Note during same encounter follows:  Patient has multiple medical problems which are significant and separately identifiable that require additional work above and beyond the Medicare Wellness Visit.      Chief Complaint  Annual Exam, Medicare Wellness-subsequent, Abstract (Pt states her and her family are struggling to get food, would like to discuss any assistance options that may be available. ), and Hypothyroidism (Pt hasn't taken Synthroid due to cost at [facility], requesting it be sent to her mail order to see if it's cheaper. )    Subjective        HPI  Nicci Mojica is also being seen today for struggling to get food for her and her family due to financial strain. Also has been unable to afford her thyroid medication, it is $45. She was unable to tolerate Euthyrox so was switched to Synthroid.   She also feels a globus sensation       Objective   Vital Signs:  /80   Pulse 58   Temp 97.9 °F (36.6 °C)   Ht 175.3 cm (69\")   Wt 93 kg (205 lb)   SpO2 98%   BMI 30.27 kg/m²     Physical Exam  Vitals and nursing note reviewed.   Constitutional:       General: She is not in acute distress.     Appearance: Normal appearance. She is obese.   HENT:      Head: Normocephalic and atraumatic.      Right Ear: Tympanic membrane, ear canal and external ear normal.      Left Ear: Tympanic membrane, ear canal and external ear normal.      Nose: Nose normal.      Mouth/Throat:      Mouth: Mucous membranes are moist.      Pharynx: Oropharynx is clear.   Eyes:      General: No scleral icterus.     Extraocular Movements: Extraocular movements intact.      Conjunctiva/sclera: Conjunctivae normal.      Pupils: Pupils are equal, round, and reactive to light.   Neck:      Thyroid: Thyromegaly (left lobe, possible nodule) present. No thyroid tenderness. "      Vascular: No carotid bruit.      Trachea: Trachea and phonation normal.   Cardiovascular:      Rate and Rhythm: Normal rate and regular rhythm.      Heart sounds: Normal heart sounds.   Pulmonary:      Effort: Pulmonary effort is normal.      Breath sounds: Normal breath sounds.   Abdominal:      General: Abdomen is flat. Bowel sounds are normal. There is no distension.      Palpations: Abdomen is soft.      Tenderness: There is no abdominal tenderness. There is no guarding.   Genitourinary:     Comments: defer  Musculoskeletal:         General: Normal range of motion.      Cervical back: Normal range of motion and neck supple. No tenderness.      Right lower leg: No edema.      Left lower leg: No edema.   Lymphadenopathy:      Cervical: No cervical adenopathy.   Skin:     General: Skin is warm and dry.      Findings: No rash.   Neurological:      Mental Status: She is alert and oriented to person, place, and time.      Cranial Nerves: No cranial nerve deficit.      Motor: No weakness.      Coordination: Coordination normal.      Gait: Gait normal.   Psychiatric:         Mood and Affect: Mood normal.         Behavior: Behavior normal.            Assessment and Plan   Diagnoses and all orders for this visit:    1. Medicare annual wellness visit, subsequent (Primary)    2. Annual physical exam    3. Essential hypertension  -     hydroCHLOROthiazide 12.5 MG tablet; Take 1 tablet by mouth Daily.  Dispense: 90 tablet; Refill: 1  -     CBC (No Diff)  -     Comprehensive Metabolic Panel  -     Lipid Panel    4. Mixed hyperlipidemia  -     Lipid Panel    5. Acquired hypothyroidism  -     Synthroid 88 MCG tablet; Take 1 tablet by mouth Daily.  Dispense: 90 tablet; Refill: 1  -     TSH Rfx On Abnormal To Free T4  -     US Thyroid; Future    6. Prediabetes  -     Comprehensive Metabolic Panel  -     Hemoglobin A1c    7. Seizures    8. Financial difficulties  -     Ambulatory Referral to Case Management Barriers to Care,  Caregiving/Support    9. Globus sensation  -     US Thyroid; Future      HTN  Stable. Continue current medications as prescribed. Recommend DASH diet, 30 minutes of exercise 5 times/week, medication compliance, and home blood pressure monitoring.     HLD  Stable. Continue statin nightly, low cholesterol diet (such as mediterranean), exercise regimen.     Hypothyroidism   Sent Synthroid to mail order to see if more affordable. Will check other options. Update TSH but she has been out of meds 1 month so suspect abnormal. Samples of synthroid provided   Feel a possible enlargement or nodule on left lobe of thyroid gland, US ordered     Prediabetes  Continue metformin, exercise, follow healthy diet. Update A1c    Seizures  Stable. Follow up neurology continue keppra    Financial difficulties   Referral case management; provided local food bank information and meal delivery services locally     Patient has been erroneously marked as diabetic. Based on the available clinical information, she does not have diabetes and should therefore be excluded from diabetic health maintenance and quality measures for the remainder of the reporting period.    Follow Up  Return in about 6 months (around 12/13/2024) for HTN, Hypothroidism .  Patient was given instructions and counseling regarding her condition or for health maintenance advice. Please see specific information pulled into the AVS if appropriate.       HANNY Doll

## 2024-06-14 LAB
ALBUMIN SERPL-MCNC: 4.2 G/DL (ref 3.8–4.8)
ALBUMIN/GLOB SERPL: 1.1 {RATIO}
ALP SERPL-CCNC: 89 IU/L (ref 44–121)
ALT SERPL-CCNC: 16 IU/L (ref 0–32)
AST SERPL-CCNC: 24 IU/L (ref 0–40)
BILIRUB SERPL-MCNC: 0.6 MG/DL (ref 0–1.2)
BUN SERPL-MCNC: 16 MG/DL (ref 8–27)
BUN/CREAT SERPL: 15 (ref 12–28)
CALCIUM SERPL-MCNC: 9.9 MG/DL (ref 8.7–10.3)
CHLORIDE SERPL-SCNC: 99 MMOL/L (ref 96–106)
CHOLEST SERPL-MCNC: 157 MG/DL (ref 100–199)
CO2 SERPL-SCNC: 27 MMOL/L (ref 20–29)
CREAT SERPL-MCNC: 1.1 MG/DL (ref 0.57–1)
EGFRCR SERPLBLD CKD-EPI 2021: 53 ML/MIN/1.73
ERYTHROCYTE [DISTWIDTH] IN BLOOD BY AUTOMATED COUNT: 13.5 % (ref 11.7–15.4)
GLOBULIN SER CALC-MCNC: 3.7 G/DL (ref 1.5–4.5)
GLUCOSE SERPL-MCNC: 99 MG/DL (ref 70–99)
HBA1C MFR BLD: 6.4 % (ref 4.8–5.6)
HCT VFR BLD AUTO: 41.3 % (ref 34–46.6)
HDLC SERPL-MCNC: 67 MG/DL
HGB BLD-MCNC: 13.3 G/DL (ref 11.1–15.9)
LDLC SERPL CALC-MCNC: 74 MG/DL (ref 0–99)
MCH RBC QN AUTO: 28.7 PG (ref 26.6–33)
MCHC RBC AUTO-ENTMCNC: 32.2 G/DL (ref 31.5–35.7)
MCV RBC AUTO: 89 FL (ref 79–97)
PLATELET # BLD AUTO: 204 X10E3/UL (ref 150–450)
POTASSIUM SERPL-SCNC: 4.2 MMOL/L (ref 3.5–5.2)
PROT SERPL-MCNC: 7.9 G/DL (ref 6–8.5)
RBC # BLD AUTO: 4.63 X10E6/UL (ref 3.77–5.28)
SODIUM SERPL-SCNC: 140 MMOL/L (ref 134–144)
T4 FREE SERPL-MCNC: 0.85 NG/DL (ref 0.82–1.77)
TRIGL SERPL-MCNC: 84 MG/DL (ref 0–149)
TSH SERPL DL<=0.005 MIU/L-ACNC: 7.78 UIU/ML (ref 0.45–4.5)
VLDLC SERPL CALC-MCNC: 16 MG/DL (ref 5–40)
WBC # BLD AUTO: 5.9 X10E3/UL (ref 3.4–10.8)

## 2024-06-14 RX ORDER — LEVOTHYROXINE SODIUM 88 MCG
88 TABLET ORAL DAILY
Qty: 28 TABLET | Refills: 0 | COMMUNITY
Start: 2024-06-14

## 2024-06-15 NOTE — PROGRESS NOTES
Kidney function stable  TSH elevated to 7.78 normal T4 (has been out of meds) continue synthroid   Ac 6.4 follow diabetic diet and exercise regularly   Other labs normal

## 2024-06-18 ENCOUNTER — REFERRAL TRIAGE (OUTPATIENT)
Age: 74
End: 2024-06-18
Payer: MEDICARE

## 2024-06-20 ENCOUNTER — PATIENT OUTREACH (OUTPATIENT)
Age: 74
End: 2024-06-20
Payer: MEDICARE

## 2024-06-20 NOTE — OUTREACH NOTE
SW attempted an outreach and left a voicemail with callback information. SW will attempt again in two days.  [facility] -   Ambulatory Case Management    6/20/2024, 10:27 EDT

## 2024-07-01 ENCOUNTER — PATIENT OUTREACH (OUTPATIENT)
Age: 74
End: 2024-07-01
Payer: MEDICARE

## 2024-07-01 NOTE — OUTREACH NOTE
Social Work Assessment  Questions/Answers      Flowsheet Row Most Recent Value   Referral Source physician   Reason for Consult community resources   Preferred Language English   Advance Care Planning Reviewed no concerns identified   People in Home spouse   Current Living Arrangements home   Potentially Unsafe Housing Conditions none   In the past 12 months has the electric, gas, oil, or water company threatened to shut off services in your home? No   Primary Care Provided by self   Provides Primary Care For no one   Employment Status retired   Source of Income pension/USP, social security   Medications independent   Meal Preparation independent   Housekeeping independent   Laundry independent   Shopping independent        SDOH updated and reviewed with the patient during this program:  --      Disabilities      Financial Resource Strain: Low Risk  (7/1/2024)    Overall Financial Resource Strain (CARDIA)     Difficulty of Paying Living Expenses: Not very hard      --     Food Insecurity: No Food Insecurity (7/1/2024)    Hunger Vital Sign     Worried About Running Out of Food in the Last Year: Never true     Ran Out of Food in the Last Year: Never true      --     Housing Stability: Not At Risk (7/1/2024)    Housing Stability     Current Living Arrangements: home     Potentially Unsafe Housing Conditions: none      --     Transportation Needs: No Transportation Needs (7/1/2024)    PRAPARE - Transportation     Lack of Transportation (Medical): No     Lack of Transportation (Non-Medical): No      --     Utilities: Not At Risk (7/1/2024)    [facility] Utilities     Threatened with loss of utilities: No         Patient Outreach    REJI contacted pt after receiving a provider referral. Pt reports that she and her spouse are having difficulty financially, though family is currently helping out and she is doing okay. They aren't eligable for SNAP as they are above limits. She is having trouble affording her Synthroid. REJI  suggested the prescription assistance program and she is interested in applying. SW will send application to provider and continue to follow.    [facility] -   Ambulatory Case Management    7/1/2024, 16:31 EDT

## 2024-07-05 ENCOUNTER — PATIENT OUTREACH (OUTPATIENT)
Age: 74
End: 2024-07-05
Payer: MEDICARE

## 2024-07-05 NOTE — OUTREACH NOTE
Care Coordination    SW faxed PAP application to providers office. SW will continue to follow and assist as needed.    [facility] -   Ambulatory Case Management    7/5/2024, 09:46 EDT

## 2024-07-08 ENCOUNTER — OFFICE VISIT (OUTPATIENT)
Dept: NEUROLOGY | Facility: CLINIC | Age: 74
End: 2024-07-08
Payer: MEDICARE

## 2024-07-08 VITALS
HEART RATE: 75 BPM | WEIGHT: 204 LBS | TEMPERATURE: 97.1 F | OXYGEN SATURATION: 100 % | BODY MASS INDEX: 30.21 KG/M2 | DIASTOLIC BLOOD PRESSURE: 60 MMHG | SYSTOLIC BLOOD PRESSURE: 100 MMHG | HEIGHT: 69 IN

## 2024-07-08 DIAGNOSIS — G40.909 SEIZURE DISORDER: Primary | ICD-10-CM

## 2024-07-08 PROCEDURE — 3078F DIAST BP <80 MM HG: CPT | Performed by: NURSE PRACTITIONER

## 2024-07-08 PROCEDURE — 3074F SYST BP LT 130 MM HG: CPT | Performed by: NURSE PRACTITIONER

## 2024-07-08 PROCEDURE — 1160F RVW MEDS BY RX/DR IN RCRD: CPT | Performed by: NURSE PRACTITIONER

## 2024-07-08 PROCEDURE — 1159F MED LIST DOCD IN RCRD: CPT | Performed by: NURSE PRACTITIONER

## 2024-07-08 PROCEDURE — 99213 OFFICE O/P EST LOW 20 MIN: CPT | Performed by: NURSE PRACTITIONER

## 2024-07-08 RX ORDER — LEVETIRACETAM 100 MG/ML
SOLUTION ORAL
Qty: 300 ML | Refills: 11 | Status: SHIPPED | OUTPATIENT
Start: 2024-07-08

## 2024-07-08 NOTE — PROGRESS NOTES
"     Follow Up Office Visit      Patient Name: Nicci Mojica  : 1950   MRN: [id number]     Chief Complaint:    Chief Complaint   Patient presents with    Follow-up     Patient in office to follow up on seizures.        History of Present Illness: Nicci Mojica is a 73 y.o. female who is here today to follow up with seizures and was last seen on 4/10/2023.  She is taking Keppra 500mg (liquid preparation) BID- reports good compliance and toleration.  She has had no seizures since her previous visit.  She does say her stress level has been significantly increased, since her previous visit- her son and his wife are living with her now, due to financial problems they are having.  Her son has schizophrenia and bipolar disorder and is currently seeing [facility] and she wants to transfer his care to [facility] (says she is is POA).    *CMP on 2024 was noncontributory.     Following taken from previous visit note:  Nicci Mojica is a 72 y.o. female who is here today to follow up with seizures and was last seen on 10/10/2022.  She is taking Keppra 500mg BID- reports good compliance and toleration.  She has had no seizures since her previous visit.  She feels she is doing well.  She did not have the sleep study done, ordered at her previous visit and states, \"I think that was all me\".  She mentions she saw her PCP regarding some knots to the right side of her neck (as I suggested at previous visit) and she ended up having several lipomas removed, from the area.  Since that surgery her headaches are much better and she is able to move her neck fully.      Subjective      Review of Systems:   Review of Systems   Neurological:  Negative for seizures.       I have reviewed and the following portions of the patient's history were updated as appropriate: past family history, past medical history, past social history, past surgical history and problem list.    Medications:     Current Outpatient " "Medications:     atorvastatin (LIPITOR) 20 MG tablet, TAKE 1 TABLET EVERY DAY, Disp: 90 tablet, Rfl: 1    Cyanocobalamin (VITAMIN B 12 PO), Take 5,000 mcg by mouth Daily., Disp: , Rfl:     hydroCHLOROthiazide 12.5 MG tablet, Take 1 tablet by mouth Daily., Disp: 90 tablet, Rfl: 1    levETIRAcetam (KEPPRA) 100 MG/ML solution, TAKE 5ML BY MOUTH TWICE DAILY AS DIRECTED, Disp: 300 mL, Rfl: 11    metFORMIN (GLUCOPHAGE) 500 MG tablet, Take 1 tablet by mouth 2 (Two) Times a Day With Meals., Disp: , Rfl:     multivitamin with minerals tablet tablet, One A Day W/Iron, Disp: , Rfl:     Synthroid 88 MCG tablet, Take 1 tablet by mouth Daily., Disp: 90 tablet, Rfl: 1    Synthroid 88 MCG tablet, Take 1 tablet by mouth Daily., Disp: 28 tablet, Rfl: 0    Allergies:   Allergies   Allergen Reactions    Sulfa Antibiotics Hives and Swelling    Contrast Dye (Echo Or Unknown Ct/Mr) Hives    Iodinated Contrast Media Hives    Influenza Vaccines Swelling    Levothyroxine Rash and Other (See Comments)     Hair loss.     Penicillins Hives and Rash       Objective     Physical Exam:  Vital Signs:   Vitals:    07/08/24 1024   BP: 100/60   BP Location: Right arm   Patient Position: Sitting   Cuff Size: Adult   Pulse: 75   Temp: 97.1 °F (36.2 °C)   SpO2: 100%   Weight: 92.5 kg (204 lb)   Height: 175.3 cm (69\")   PainSc: 0-No pain     Body mass index is 30.13 kg/m².    Physical Exam  Vitals and nursing note reviewed.   Constitutional:       General: She is not in acute distress.     Appearance: Normal appearance. She is well-developed. She is not diaphoretic.   HENT:      Head: Normocephalic and atraumatic.   Eyes:      Extraocular Movements: Extraocular movements intact.      Conjunctiva/sclera: Conjunctivae normal.   Pulmonary:      Effort: Pulmonary effort is normal. No respiratory distress.   Musculoskeletal:         General: Normal range of motion.   Skin:     General: Skin is warm and dry.   Neurological:      Mental Status: She is alert and " oriented to person, place, and time.   Psychiatric:         Mood and Affect: Mood normal.         Behavior: Behavior normal.         Thought Content: Thought content normal.         Judgment: Judgment normal.         Neurologic Exam     Mental Status   Oriented to person, place, and time.        Assessment / Plan      Assessment/Plan:   Diagnoses and all orders for this visit:    1. Seizure disorder (Primary)  -     levETIRAcetam (KEPPRA) 100 MG/ML solution; TAKE 5ML BY MOUTH TWICE DAILY AS DIRECTED  Dispense: 300 mL; Refill: 11    2. BMI 30.0-30.9,adult    *Seizure precautions discussed and encouraged. She is aware of [place] laws regarding seizures and driving.      Follow Up:   Return in about 1 year (around 7/8/2025) for Follow Up.    HANNY Garza, FNP-C  [facility]

## 2024-07-10 ENCOUNTER — PATIENT OUTREACH (OUTPATIENT)
Age: 74
End: 2024-07-10
Payer: MEDICARE

## 2024-07-10 NOTE — OUTREACH NOTE
Patient Outreach    SW contacted pt for follow up. SW to mail PAP application. SW also provided information on Aid and Attendance and will mail info as well.    [facility] -   Ambulatory Case Management    7/10/2024, 16:26 EDT

## 2024-09-05 ENCOUNTER — HOSPITAL ENCOUNTER (OUTPATIENT)
Dept: ULTRASOUND IMAGING | Facility: HOSPITAL | Age: 74
Discharge: HOME | End: 2024-09-05
Admitting: NURSE PRACTITIONER
Payer: MEDICARE

## 2024-09-05 ENCOUNTER — PATIENT OUTREACH (OUTPATIENT)
Age: 74
End: 2024-09-05
Payer: MEDICARE

## 2024-09-05 DIAGNOSIS — E03.9 ACQUIRED HYPOTHYROIDISM: ICD-10-CM

## 2024-09-05 DIAGNOSIS — R09.A2 GLOBUS SENSATION: ICD-10-CM

## 2024-09-05 PROCEDURE — 76536 US EXAM OF HEAD AND NECK: CPT

## 2024-09-05 NOTE — PROGRESS NOTES
No thyroid nodule noted.  Your thyroid is at the upper limits of normal but otherwise normal.  If you continue to have issues swallowing can consider swallow study and/or referral for EGD which is where they give you sedation look down your throat to make sure no abnormalities.

## 2024-09-05 NOTE — OUTREACH NOTE
Patient Outreach    SW contacted pt for follow up. Pt reports she did apply for the Synthroid PAP. About two weeks ago, they requested more information and she faxed the info to them, but has not heard back. SW offered to call to follow up and pt accepted.  Care Coordination    SW contacted [facility]. Representative stated that pt was declined because she had [employer] insurance. SW explained that pt has a  supplement, not [place] insurance. Representative stated she was ineligible.   SW attempted to call pt back to discuss, call was disconnected. REJI will follow up at another time.    [facility] -   Ambulatory Case Management    9/5/2024, 14:36 EDT

## 2024-09-09 DIAGNOSIS — E03.9 ACQUIRED HYPOTHYROIDISM: ICD-10-CM

## 2024-09-09 RX ORDER — LEVOTHYROXINE SODIUM 88 MCG
88 TABLET ORAL DAILY
Qty: 90 TABLET | Refills: 1 | Status: SHIPPED | OUTPATIENT
Start: 2024-09-09

## 2024-09-09 NOTE — PROGRESS NOTES
Refill sent.  Patient needs to obtain labs around October 21 which will be 6 weeks after taking the thyroid medication

## 2024-09-10 ENCOUNTER — PATIENT OUTREACH (OUTPATIENT)
Age: 74
End: 2024-09-10
Payer: MEDICARE

## 2024-09-10 NOTE — OUTREACH NOTE
Care Coordination    SW contacted [facility] to discuss reason for denial. SW advised that while pt does have , she only has a medicare supplement. Representative stated pt would need to submit her social security award letter and copy of incsurance cards.  Patient Outreach    SW contacted pt to review. Pt stated that he medication is changing and she can now afford it, so doesn't need to follow up with the patient assistance program. SW encouraged her to call if she has any needs in the future.    [facility] -   Ambulatory Case Management    9/10/2024, 13:00 EDT

## 2024-11-06 DIAGNOSIS — I10 ESSENTIAL HYPERTENSION: ICD-10-CM

## 2024-11-06 RX ORDER — HYDROCHLOROTHIAZIDE 12.5 MG/1
12.5 TABLET ORAL DAILY
Qty: 90 TABLET | Refills: 3 | Status: SHIPPED | OUTPATIENT
Start: 2024-11-06